# Patient Record
Sex: FEMALE | Race: WHITE | NOT HISPANIC OR LATINO | Employment: FULL TIME | URBAN - METROPOLITAN AREA
[De-identification: names, ages, dates, MRNs, and addresses within clinical notes are randomized per-mention and may not be internally consistent; named-entity substitution may affect disease eponyms.]

---

## 2017-09-03 ENCOUNTER — COMMUNICATION - HEALTHEAST (OUTPATIENT)
Dept: SCHEDULING | Facility: CLINIC | Age: 37
End: 2017-09-03

## 2020-02-24 ENCOUNTER — COMMUNICATION - HEALTHEAST (OUTPATIENT)
Dept: SCHEDULING | Facility: CLINIC | Age: 40
End: 2020-02-24

## 2020-09-04 ENCOUNTER — TRANSFERRED RECORDS (OUTPATIENT)
Dept: HEALTH INFORMATION MANAGEMENT | Facility: CLINIC | Age: 40
End: 2020-09-04

## 2020-10-12 ENCOUNTER — COMMUNICATION - HEALTHEAST (OUTPATIENT)
Dept: FAMILY MEDICINE | Facility: CLINIC | Age: 40
End: 2020-10-12

## 2020-10-13 ENCOUNTER — COMMUNICATION - HEALTHEAST (OUTPATIENT)
Dept: FAMILY MEDICINE | Facility: CLINIC | Age: 40
End: 2020-10-13

## 2020-12-16 ENCOUNTER — COMMUNICATION - HEALTHEAST (OUTPATIENT)
Dept: FAMILY MEDICINE | Facility: CLINIC | Age: 40
End: 2020-12-16

## 2020-12-17 ENCOUNTER — OFFICE VISIT - HEALTHEAST (OUTPATIENT)
Dept: FAMILY MEDICINE | Facility: CLINIC | Age: 40
End: 2020-12-17

## 2020-12-17 DIAGNOSIS — R25.1 TREMOR: ICD-10-CM

## 2020-12-17 DIAGNOSIS — F07.81 POST CONCUSSIVE SYNDROME: ICD-10-CM

## 2020-12-17 DIAGNOSIS — K76.9 LESION OF LIVER: ICD-10-CM

## 2020-12-17 DIAGNOSIS — F41.1 GAD (GENERALIZED ANXIETY DISORDER): ICD-10-CM

## 2020-12-17 DIAGNOSIS — Z13.220 LIPID SCREENING: ICD-10-CM

## 2020-12-17 DIAGNOSIS — B97.7 HIGH RISK HPV INFECTION: ICD-10-CM

## 2020-12-17 LAB
ALBUMIN SERPL-MCNC: 4.4 G/DL (ref 3.5–5)
ALP SERPL-CCNC: 58 U/L (ref 45–120)
ALT SERPL W P-5'-P-CCNC: 11 U/L (ref 0–45)
ANION GAP SERPL CALCULATED.3IONS-SCNC: 11 MMOL/L (ref 5–18)
AST SERPL W P-5'-P-CCNC: 14 U/L (ref 0–40)
BILIRUB SERPL-MCNC: 0.5 MG/DL (ref 0–1)
BUN SERPL-MCNC: 8 MG/DL (ref 8–22)
CALCIUM SERPL-MCNC: 9 MG/DL (ref 8.5–10.5)
CHLORIDE BLD-SCNC: 104 MMOL/L (ref 98–107)
CHOLEST SERPL-MCNC: 209 MG/DL
CO2 SERPL-SCNC: 25 MMOL/L (ref 22–31)
CREAT SERPL-MCNC: 1.03 MG/DL (ref 0.6–1.1)
ERYTHROCYTE [DISTWIDTH] IN BLOOD BY AUTOMATED COUNT: 11.5 % (ref 11–14.5)
FASTING STATUS PATIENT QL REPORTED: NO
GFR SERPL CREATININE-BSD FRML MDRD: 59 ML/MIN/1.73M2
GLUCOSE BLD-MCNC: 88 MG/DL (ref 70–125)
HCT VFR BLD AUTO: 39.3 % (ref 35–47)
HDLC SERPL-MCNC: 77 MG/DL
HGB BLD-MCNC: 13.2 G/DL (ref 12–16)
LDLC SERPL CALC-MCNC: 114 MG/DL
MCH RBC QN AUTO: 30.4 PG (ref 27–34)
MCHC RBC AUTO-ENTMCNC: 33.5 G/DL (ref 32–36)
MCV RBC AUTO: 91 FL (ref 80–100)
PLATELET # BLD AUTO: 276 THOU/UL (ref 140–440)
PMV BLD AUTO: 7.3 FL (ref 7–10)
POTASSIUM BLD-SCNC: 4.4 MMOL/L (ref 3.5–5)
PROT SERPL-MCNC: 7 G/DL (ref 6–8)
RBC # BLD AUTO: 4.32 MILL/UL (ref 3.8–5.4)
SODIUM SERPL-SCNC: 140 MMOL/L (ref 136–145)
TRIGL SERPL-MCNC: 92 MG/DL
WBC: 5.8 THOU/UL (ref 4–11)

## 2020-12-17 RX ORDER — DIPHENHYDRAMINE HCL 25 MG
25 CAPSULE ORAL
Status: SHIPPED | COMMUNITY
Start: 2020-10-23 | End: 2021-07-14

## 2020-12-17 RX ORDER — NITROFURANTOIN 25; 75 MG/1; MG/1
CAPSULE ORAL
Status: SHIPPED | COMMUNITY
Start: 2020-12-16 | End: 2021-07-14

## 2020-12-17 RX ORDER — HYDROXYZINE PAMOATE 25 MG/1
25 CAPSULE ORAL 2 TIMES DAILY PRN
Status: SHIPPED | COMMUNITY
Start: 2020-12-17

## 2020-12-17 RX ORDER — RIBOFLAVIN (VITAMIN B2) 100 MG
400 TABLET ORAL
Status: SHIPPED | COMMUNITY
Start: 2020-10-23 | End: 2021-07-14

## 2020-12-17 RX ORDER — CYCLOBENZAPRINE HCL 10 MG
TABLET ORAL
Status: SHIPPED | COMMUNITY
Start: 2020-09-06 | End: 2021-07-14

## 2020-12-17 RX ORDER — AMITRIPTYLINE HYDROCHLORIDE 50 MG/1
50 TABLET ORAL
Status: SHIPPED | COMMUNITY
Start: 2020-10-23 | End: 2021-08-11

## 2020-12-17 RX ORDER — BUPROPION HYDROCHLORIDE 150 MG/1
150 TABLET, EXTENDED RELEASE ORAL
Status: SHIPPED | COMMUNITY
Start: 2020-12-17 | End: 2021-12-20

## 2020-12-17 ASSESSMENT — MIFFLIN-ST. JEOR: SCORE: 1510.68

## 2020-12-18 LAB
HPV SOURCE: NORMAL
HUMAN PAPILLOMA VIRUS 16 DNA: NEGATIVE
HUMAN PAPILLOMA VIRUS 18 DNA: NEGATIVE
HUMAN PAPILLOMA VIRUS FINAL DIAGNOSIS: NORMAL
HUMAN PAPILLOMA VIRUS OTHER HR: NEGATIVE
SPECIMEN DESCRIPTION: NORMAL

## 2020-12-19 ENCOUNTER — COMMUNICATION - HEALTHEAST (OUTPATIENT)
Dept: FAMILY MEDICINE | Facility: CLINIC | Age: 40
End: 2020-12-19

## 2020-12-29 ENCOUNTER — COMMUNICATION - HEALTHEAST (OUTPATIENT)
Dept: OBGYN | Facility: CLINIC | Age: 40
End: 2020-12-29

## 2020-12-29 ENCOUNTER — COMMUNICATION - HEALTHEAST (OUTPATIENT)
Dept: FAMILY MEDICINE | Facility: CLINIC | Age: 40
End: 2020-12-29

## 2021-01-22 ENCOUNTER — RECORDS - HEALTHEAST (OUTPATIENT)
Dept: ADMINISTRATIVE | Facility: OTHER | Age: 41
End: 2021-01-22

## 2021-01-25 ENCOUNTER — RECORDS - HEALTHEAST (OUTPATIENT)
Dept: ADMINISTRATIVE | Facility: OTHER | Age: 41
End: 2021-01-25

## 2021-01-25 ENCOUNTER — OFFICE VISIT - HEALTHEAST (OUTPATIENT)
Dept: FAMILY MEDICINE | Facility: CLINIC | Age: 41
End: 2021-01-25

## 2021-01-25 DIAGNOSIS — R50.9 FEVER, UNSPECIFIED FEVER CAUSE: ICD-10-CM

## 2021-01-25 DIAGNOSIS — J06.9 VIRAL URI WITH COUGH: ICD-10-CM

## 2021-01-25 DIAGNOSIS — R05.9 COUGH: ICD-10-CM

## 2021-01-25 LAB
FLUAV AG SPEC QL IA: NORMAL
FLUBV AG SPEC QL IA: NORMAL

## 2021-01-27 ENCOUNTER — COMMUNICATION - HEALTHEAST (OUTPATIENT)
Dept: SCHEDULING | Facility: CLINIC | Age: 41
End: 2021-01-27

## 2021-01-28 ENCOUNTER — COMMUNICATION - HEALTHEAST (OUTPATIENT)
Dept: FAMILY MEDICINE | Facility: CLINIC | Age: 41
End: 2021-01-28

## 2021-01-28 ENCOUNTER — OFFICE VISIT - HEALTHEAST (OUTPATIENT)
Dept: FAMILY MEDICINE | Facility: CLINIC | Age: 41
End: 2021-01-28

## 2021-01-28 DIAGNOSIS — G44.209 TENSION HEADACHE: ICD-10-CM

## 2021-01-28 DIAGNOSIS — R11.2 NON-INTRACTABLE VOMITING WITH NAUSEA, UNSPECIFIED VOMITING TYPE: ICD-10-CM

## 2021-01-28 DIAGNOSIS — E87.1 HYPONATREMIA: ICD-10-CM

## 2021-01-28 DIAGNOSIS — Z20.822 SUSPECTED COVID-19 VIRUS INFECTION: ICD-10-CM

## 2021-01-28 RX ORDER — ONDANSETRON 4 MG/1
4 TABLET, ORALLY DISINTEGRATING ORAL EVERY 8 HOURS PRN
Qty: 30 TABLET | Refills: 0 | Status: SHIPPED | OUTPATIENT
Start: 2021-01-28

## 2021-01-28 RX ORDER — NAPROXEN 500 MG/1
500 TABLET ORAL 2 TIMES DAILY PRN
Qty: 60 TABLET | Refills: 1 | Status: SHIPPED | OUTPATIENT
Start: 2021-01-28 | End: 2021-12-20

## 2021-03-22 ENCOUNTER — TRANSFERRED RECORDS (OUTPATIENT)
Dept: HEALTH INFORMATION MANAGEMENT | Facility: CLINIC | Age: 41
End: 2021-03-22

## 2021-03-25 ENCOUNTER — OFFICE VISIT - HEALTHEAST (OUTPATIENT)
Dept: FAMILY MEDICINE | Facility: CLINIC | Age: 41
End: 2021-03-25

## 2021-03-25 ENCOUNTER — COMMUNICATION - HEALTHEAST (OUTPATIENT)
Dept: FAMILY MEDICINE | Facility: CLINIC | Age: 41
End: 2021-03-25

## 2021-03-25 DIAGNOSIS — F41.1 GAD (GENERALIZED ANXIETY DISORDER): ICD-10-CM

## 2021-03-25 DIAGNOSIS — M47.22 OSTEOARTHRITIS OF SPINE WITH RADICULOPATHY, CERVICAL REGION: ICD-10-CM

## 2021-03-25 DIAGNOSIS — M47.816 LUMBAR SPONDYLOSIS: ICD-10-CM

## 2021-03-25 DIAGNOSIS — S06.9X9D MILD TRAUMATIC BRAIN INJURY WITH LOSS OF CONSCIOUSNESS, SUBSEQUENT ENCOUNTER: ICD-10-CM

## 2021-03-25 DIAGNOSIS — R25.1 TREMOR: ICD-10-CM

## 2021-03-25 DIAGNOSIS — K76.9 LESION OF LIVER: ICD-10-CM

## 2021-03-25 RX ORDER — TIZANIDINE 2 MG/1
2 TABLET ORAL
Status: SHIPPED | COMMUNITY
Start: 2021-03-24 | End: 2021-07-14

## 2021-03-25 RX ORDER — BACLOFEN 10 MG/1
TABLET ORAL
Status: SHIPPED | COMMUNITY
Start: 2021-03-12 | End: 2021-07-14

## 2021-03-25 RX ORDER — CLONAZEPAM 1 MG/1
1 TABLET ORAL 2 TIMES DAILY PRN
Status: SHIPPED | COMMUNITY
Start: 2021-03-19

## 2021-04-27 ENCOUNTER — RECORDS - HEALTHEAST (OUTPATIENT)
Dept: ADMINISTRATIVE | Facility: OTHER | Age: 41
End: 2021-04-27

## 2021-05-26 VITALS
SYSTOLIC BLOOD PRESSURE: 119 MMHG | WEIGHT: 180.4 LBS | HEART RATE: 84 BPM | HEIGHT: 67 IN | DIASTOLIC BLOOD PRESSURE: 83 MMHG | BODY MASS INDEX: 28.31 KG/M2

## 2021-06-02 ENCOUNTER — RECORDS - HEALTHEAST (OUTPATIENT)
Dept: ADMINISTRATIVE | Facility: OTHER | Age: 41
End: 2021-06-02

## 2021-06-05 VITALS — HEIGHT: 68 IN | BODY MASS INDEX: 27.43 KG/M2

## 2021-06-05 VITALS — BODY MASS INDEX: 26.61 KG/M2 | WEIGHT: 175 LBS

## 2021-06-06 NOTE — TELEPHONE ENCOUNTER
Patient states that she has struggled with an abnormal heart beat for years. However tonight it is different with tightness in chest, a little bit of heartburn, a little shortness of breath, and yawning constantly    No previous cardiac work-up    Beating really hard and fast then slows for a little bit and then goes back to beating hard and fast  -lasts about a minute    Has had the irregularity often over the years but not the tightness and heartburn    Rates tightness as a 2-3/10, pressure like  Heart burn 4/10    Does not get heart burn often, only when pregnant. Last pregnancy 11 years ago  -does not think she is pregnant,  had vasectomy     Started 3 hours ago, nothing makes it better or worse. Is staying the same    Has had some anxiety this evening, son has behavioral problems and they had an outburst tonight    Strong family history for heart disease including AFib and heart attacks.     Quit smoking 3 months ago    No hx HTN  No hx diabetes  No hx obesity  No hx hypercholesteremia   No birthcontrol   No hx blood clots  No hx Emphysema     No dizziness  No fever  No sweating  No nausea or vomiting  No cough    Triaged to a disposition of Call 911 EMS Now. Patient is agreeable to either call EMS or to Go Directly to ED.     Reason for Disposition    [1] Chest pain lasts > 5 minutes AND [2] described as crushing, pressure-like, or heavy    [1] Chest pain lasts > 5 minutes AND [2] age > 30 AND [3] at least one cardiac risk factor (i.e., hypertension, diabetes, obesity, smoker or strong family history of heart disease)    Protocols used: CHEST PAIN-A-    Michelle Carrasco RN Triage Nurse Advisor

## 2021-06-12 NOTE — TELEPHONE ENCOUNTER
Left message to call back for: Alex  Information to relay to patient:  LMTCB. Pt has a meet and greet scheduled with Dr. Spencer this afternoon. Does pt have a concern she would like to discuss with Dr. Spencer. Please let her know she will be charged an office visit for this appointment.
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
## 2021-06-13 NOTE — TELEPHONE ENCOUNTER
Left message to call back for: Alex  Information to relay to patient:  STANLEY. Pt has a meet and greet scheduled with Dr. Spencer on 12/17. Does pt have a concern she would like to discuss with Dr. Spencer. Please let her know she will be charged an office visit for this appointment.

## 2021-06-14 NOTE — PATIENT INSTRUCTIONS - HE
"Xray was normal.  I will call if your flu is positive in about 20-30 minutes.  Covid test will be back tomorrow - sign up for and use [employer] to get results ASAP.  Isolate in your own room away from others.        Symptomatic treatment - rest, push fluids, tylenol or ibuprofen.  Come back if increasing shortness of breath (eg. Can't walk around house without taking a break due to shortness of breath)    Discharge Instructions for COVID-19 Patients  You have--or may have--COVID-19. Please follow the instructions listed below.   If you have a weakened immune system, discuss with your doctor any other actions you need to take.  How can I protect others?  If you have symptoms (fever, cough, body aches or trouble breathing):    Stay home and away from others (self-isolate) until:  ? Your other symptoms have resolved (gotten better). And   ? You've had no fever--and no medicine that reduces fever--for 1 full day (24 hours). And   ? At least 10 days have passed since your symptoms started. (You may need to wait 20 days. Follow the advice of your care team.)  If you don't show symptoms, but testing showed that you have COVID-19:    Stay home and away from others (self-isolate) until at least 10 days have passed since the date of your first positive COVID-19 test.  During this time    Stay in your own room, even for meals. Use your own bathroom if you can.    Stay away from others in your home. No hugging, kissing or shaking hands. No visitors.    Don't go to work, school or anywhere else.    Clean \"high touch\" surfaces often (doorknobs, counters, handles). Use household cleaning spray or wipes.    You'll find a full list of  on the EPA website: www.epa.gov/pesticide-registration/list-n-disinfectants-use-against-sars-cov-2.    Cover your mouth and nose with a mask or other face covering to avoid spreading germs.    Wash your hands and face often. Use soap and water.    Caregivers in these groups are at risk for " severe illness due to COVID-19:  ? People 65 years and older  ? People who live in a nursing home or long-term care facility  ? People with chronic disease (lung, heart, cancer, diabetes, kidney, liver, immunologic)  ? People who have a weakened immune system, including those who:    Are in cancer treatment    Take medicine that weakens the immune system, such as corticosteroids    Had a bone marrow or organ transplant    Have an immune deficiency    Have poorly controlled HIV or AIDS    Are obese (body mass index of 40 or higher)    Smoke regularly    Caregivers should wear gloves while washing dishes, handling laundry and cleaning bedrooms and bathrooms.    Use caution when washing and drying laundry: Don't shake dirty laundry and use the warmest water setting that you can.    For more tips on managing your health at home, go to www.cdc.gov/coronavirus/2019-ncov/downloads/10Things.pdf.  How can I take care of myself at home?  1. Get lots of rest. Drink extra fluids (unless a doctor has told you not to).  2. Take Tylenol (acetaminophen) for fever or pain. If you have liver or kidney problems, ask your family doctor if it's okay to take Tylenol.   Adults can take either:   ? 650 mg (two 325 mg pills) every 4 to 6 hours, or   ? 1,000 mg (two 500 mg pills) every 8 hours as needed.  ? Note: Don't take more than 3,000 mg in one day. Acetaminophen is found in many medicines (both prescribed and over-the-counter medicines). Read all labels to be sure you don't take too much.   For children, check the Tylenol bottle for the right dose. The dose is based on the child's age or weight.  3. If you have other health problems (like cancer, heart failure, an organ transplant or severe kidney disease): Call your specialty clinic if you don't feel better in the next 2 days.  4. Know when to call 911. Emergency warning signs include:  ? Trouble breathing or shortness of breath  ? Pain or pressure in the chest that doesn't go  away  ? Feeling confused like you haven't felt before, or not being able to wake up  ? Bluish-colored lips or face  5. Your doctor may have prescribed a blood thinner medicine. Follow their instructions.  Where can I get more information?    [facility] - About COVID-19: [email or web address]    CDC - What to Do If You're Sick: www.cdc.gov/coronavirus/2019-ncov/about/steps-when-sick.html    CDC - Ending Home Isolation: www.cdc.gov/coronavirus/2019-ncov/hcp/disposition-in-home-patients.html    [facility] [place] - Caring for Someone: www.cdc.gov/coronavirus/2019-ncov/if-you-are-sick/care-for-someone.html    [facility] - Interim Guidance for Hospital Discharge to Home: www.[facility].[place].us/diseases/coronavirus/hcp/hospdischarge.pdf    Below are the COVID-19 hotlines at the [place] Department of Health ([facility]). Interpreters are available.  ? For health questions: Call 481-898-7176 or 1-716.689.8554 (7 a.m. to 7 p.m.)  ? For questions about schools and childcare: Call 486-549-5764 or 1-104.883.1167 (7 a.m. to 7 p.m.)    For informational purposes only. Not to replace the advice of your health care provider. Clinically reviewed by Dr. Terrence Levin.   Copyright   2020 [facility]. All rights reserved. [employer]â„¢ 263712 - REV 01/05/21.

## 2021-06-14 NOTE — TELEPHONE ENCOUNTER
Patient can be seen at walk-in clinic or can try a virtual visit.  It sounds like she should probably be seen.

## 2021-06-14 NOTE — TELEPHONE ENCOUNTER
Patient's Contreras is calling because patient was tested for Covid and flu on Monday and it came back negative. Patient is not feeling any better and they are wondering if she should be tested again or what steps they should take next to help her.  Contreras can be reached at [phone] for more information or advice.  You can call patient too but she will probably not answer if she is sleeping.
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
## 2021-06-14 NOTE — PROGRESS NOTES
Chief Complaint   Patient presents with     Cough     x3d, SOB, bodyaches, HA, dizziness, nausea, fever       ASSESSMENT & PLAN:   Diagnoses and all orders for this visit:    Viral URI with cough    Cough  -     Symptomatic COVID-19 Virus (CORONAVIRUS) PCR  -     XR Chest 2 Views  -     Influenza A/B Rapid Test- Nasal Swab    Fever, unspecified fever cause  -     Symptomatic COVID-19 Virus (CORONAVIRUS) PCR  -     XR Chest 2 Views  -     Influenza A/B Rapid Test- Nasal Swab      Patient with Covid symptoms associated with mild shortness of breath.  X-ray is negative.  Flu is negative.  Isolation discussed.  Recheck in 4 days if still febrile, sooner if shortness of breath is worsening.  Vital signs are normal and she is stable for discharge at this time.      Push fluids.  Symptomatic care.    Supportive care discussed.  See discharge instructions below for specific recommendations given.    At the end of the encounter, I discussed results, diagnosis, medications with the patient and/or caregivers. Discussed red flags for immediate return to clinic/ER, as well as indications for follow up if no improvement. Patient and/or caregiver understood and agreed to plan. Patient was stable for discharge.          SUBJECTIVE    HPI:  HPI  Alex MCCARTHY presents to the walk-in clinic with   Chief Complaint   Patient presents with     Cough     x3d, SOB, bodyaches, HA, dizziness, nausea, fever     Agree with MA note.  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    No one else is ill at this time.    Feeling a little shortness of breath, severe body aches, fever, poor appetite, loss of smell.    See ROS for additional symptoms and/or pertinent negatives.       History obtained from spouse and the patient.      Review of Systems    OBJECTIVE    Vitals:    01/25/21 1736   BP: 104/73   Patient Site: Left Arm   Patient Position: Lying   Cuff Size: Adult Regular   Pulse: (!) 109   Resp: 20   Temp: (!)  101.8  F (38.8  C)   TempSrc: Oral   SpO2: 95%       Physical Exam  Constitutional:       General: She is not in acute distress.     Appearance: She is well-developed.   HENT:      Right Ear: Tympanic membrane and external ear normal.      Left Ear: Tympanic membrane and external ear normal.      Nose: No congestion or rhinorrhea.      Mouth/Throat:      Pharynx: Posterior oropharyngeal erythema (Mild) present.   Eyes:      General:         Right eye: No discharge.         Left eye: No discharge.      Conjunctiva/sclera: Conjunctivae normal.   Cardiovascular:      Rate and Rhythm: Tachycardia present.   Pulmonary:      Effort: Pulmonary effort is normal.      Breath sounds: Normal breath sounds.      Comments: Intermittent wet cough noted  Musculoskeletal: Normal range of motion.   Lymphadenopathy:      Cervical: No cervical adenopathy.   Skin:     General: Skin is warm and dry.      Capillary Refill: Capillary refill takes less than 2 seconds.      Coloration: Skin is pale.   Neurological:      Mental Status: She is alert and oriented to person, place, and time.   Psychiatric:         Mood and Affect: Mood normal.         Behavior: Behavior normal.         Thought Content: Thought content normal.         Judgment: Judgment normal.         Labs/EKG:  Results for orders placed or performed in visit on 01/25/21   Influenza A/B Rapid Test- Nasal Swab    Specimen: Nasal Swab; Nasopharyngeal (Inpt/ED) or Nasal Mucosa (Outpt)   Result Value Ref Range    Influenza  A, Rapid Antigen No Influenza A antigen detected No Influenza A antigen detected    Influenza B, Rapid Antigen No Influenza B antigen detected No Influenza B antigen detected           Radiology:    Xr Chest 2 Views    Result Date: 1/25/2021  EXAM DATE:         01/25/2021 EXAM: X-RAY CHEST, 2 VIEWS, FRONTAL AND LATERAL LOCATION: [facility] DATE/TIME: 1/25/2021 6:15 PM INDICATION: cough, fever, SOB COMPARISON: None. IMPRESSION: Negative chest.  "      Radiology result(s) from this visit was independently reviewed by Caity Aceves CNP -no acute pathology identified inclusive of consolidation or Covid pneumonia      PATIENT INSTRUCTIONS:   Patient Instructions   Xray was normal.  I will call if your flu is positive in about 20-30 minutes.  Covid test will be back tomorrow - sign up for and use [employer] to get results ASAP.  Isolate in your own room away from others.        Symptomatic treatment - rest, push fluids, tylenol or ibuprofen.  Come back if increasing shortness of breath (eg. Can't walk around house without taking a break due to shortness of breath)    Discharge Instructions for COVID-19 Patients  You have--or may have--COVID-19. Please follow the instructions listed below.   If you have a weakened immune system, discuss with your doctor any other actions you need to take.  How can I protect others?  If you have symptoms (fever, cough, body aches or trouble breathing):    Stay home and away from others (self-isolate) until:  ? Your other symptoms have resolved (gotten better). And   ? You've had no fever--and no medicine that reduces fever--for 1 full day (24 hours). And   ? At least 10 days have passed since your symptoms started. (You may need to wait 20 days. Follow the advice of your care team.)  If you don't show symptoms, but testing showed that you have COVID-19:    Stay home and away from others (self-isolate) until at least 10 days have passed since the date of your first positive COVID-19 test.  During this time    Stay in your own room, even for meals. Use your own bathroom if you can.    Stay away from others in your home. No hugging, kissing or shaking hands. No visitors.    Don't go to work, school or anywhere else.    Clean \"high touch\" surfaces often (doorknobs, counters, handles). Use household cleaning spray or wipes.    You'll find a full list of  on the EPA website: " www.epa.gov/pesticide-registration/list-n-disinfectants-use-against-sars-cov-2.    Cover your mouth and nose with a mask or other face covering to avoid spreading germs.    Wash your hands and face often. Use soap and water.    Caregivers in these groups are at risk for severe illness due to COVID-19:  ? People 65 years and older  ? People who live in a nursing home or long-term care facility  ? People with chronic disease (lung, heart, cancer, diabetes, kidney, liver, immunologic)  ? People who have a weakened immune system, including those who:    Are in cancer treatment    Take medicine that weakens the immune system, such as corticosteroids    Had a bone marrow or organ transplant    Have an immune deficiency    Have poorly controlled HIV or AIDS    Are obese (body mass index of 40 or higher)    Smoke regularly    Caregivers should wear gloves while washing dishes, handling laundry and cleaning bedrooms and bathrooms.    Use caution when washing and drying laundry: Don't shake dirty laundry and use the warmest water setting that you can.    For more tips on managing your health at home, go to www.cdc.gov/coronavirus/2019-ncov/downloads/10Things.pdf.  How can I take care of myself at home?  1. Get lots of rest. Drink extra fluids (unless a doctor has told you not to).  2. Take Tylenol (acetaminophen) for fever or pain. If you have liver or kidney problems, ask your family doctor if it's okay to take Tylenol.   Adults can take either:   ? 650 mg (two 325 mg pills) every 4 to 6 hours, or   ? 1,000 mg (two 500 mg pills) every 8 hours as needed.  ? Note: Don't take more than 3,000 mg in one day. Acetaminophen is found in many medicines (both prescribed and over-the-counter medicines). Read all labels to be sure you don't take too much.   For children, check the Tylenol bottle for the right dose. The dose is based on the child's age or weight.  3. If you have other health problems (like cancer, heart failure, an organ  transplant or severe kidney disease): Call your specialty clinic if you don't feel better in the next 2 days.  4. Know when to call 911. Emergency warning signs include:  ? Trouble breathing or shortness of breath  ? Pain or pressure in the chest that doesn't go away  ? Feeling confused like you haven't felt before, or not being able to wake up  ? Bluish-colored lips or face  5. Your doctor may have prescribed a blood thinner medicine. Follow their instructions.  Where can I get more information?    [facility] - About COVID-19: [email or web address]    CDC - What to Do If You're Sick: www.cdc.gov/coronavirus/2019-ncov/about/steps-when-sick.html    CDC - Ending Home Isolation: www.cdc.gov/coronavirus/2019-ncov/hcp/disposition-in-home-patients.html    CDC - Caring for Someone: www.cdc.gov/coronavirus/2019-ncov/if-you-are-sick/care-for-someone.html    [facility] - Interim Guidance for Hospital Discharge to Home: www.health.[facility].mn./diseases/coronavirus/hcp/hospdischarge.pdf    Below are the COVID-19 hotlines at the [place] Department of Health ([facility]). Interpreters are available.  ? For health questions: Call 014-438-6050 or 1-259.290.1491 (7 a.m. to 7 p.m.)  ? For questions about schools and childcare: Call 628-257-0024 or 1-811.274.9817 (7 a.m. to 7 p.m.)    For informational purposes only. Not to replace the advice of your health care provider. Clinically reviewed by Dr. Terrence Levin.   Copyright   2020 [employer]. All rights reserved. [employer] [id number] - REV 01/05/21.

## 2021-06-14 NOTE — PROGRESS NOTES
12/4/14 NIL pap, neg HPV  9/28/18 NIL pap, +HR HPV (not 16/18)  12/17/20 NIL pap, neg HPV. Plan: cotest in 1 year

## 2021-06-16 PROBLEM — R25.1 TREMOR: Status: ACTIVE | Noted: 2020-12-20

## 2021-06-16 PROBLEM — M47.816 LUMBAR SPONDYLOSIS: Status: ACTIVE | Noted: 2021-03-10
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
## 2021-06-16 PROBLEM — M47.812 OSTEOARTHRITIS OF CERVICAL SPINE: Status: ACTIVE | Noted: 2021-03-10

## 2021-06-16 PROBLEM — S06.9XAA MILD TRAUMATIC BRAIN INJURY (H): Status: ACTIVE | Noted: 2021-03-10

## 2021-06-16 PROBLEM — Z86.79 HISTORY OF IRREGULAR HEARTBEAT: Status: ACTIVE | Noted: 2020-09-16

## 2021-06-16 PROBLEM — F43.23 ADJUSTMENT DISORDER WITH MIXED ANXIETY AND DEPRESSED MOOD: Status: ACTIVE | Noted: 2021-03-10

## 2021-06-16 PROBLEM — G89.29 CHRONIC NONINTRACTABLE HEADACHE: Status: ACTIVE | Noted: 2021-03-10

## 2021-06-16 PROBLEM — R51.9 CHRONIC NONINTRACTABLE HEADACHE: Status: ACTIVE | Noted: 2021-03-10

## 2021-06-16 PROBLEM — K76.9 LESION OF LIVER: Status: ACTIVE | Noted: 2020-12-20

## 2021-06-16 NOTE — TELEPHONE ENCOUNTER
Incoming call from Stephanie from [facility] stating patient cancelled her MRI for the hepatic hemangioma because insurance would not cover scan.  FYI.
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
## 2021-06-18 NOTE — PATIENT INSTRUCTIONS - HE
Patient Instructions by Laura Resendez DO at 1/28/2021  1:40 PM     Author: Laura Resendez DO Service: -- Author Type: Physician    Filed: 1/28/2021  1:53 PM Encounter Date: 1/28/2021 Status: Signed    : Laura Resendez DO (Physician)       Patient Education     Coronavirus Disease 2019 (COVID-19): Caring for Yourself or Others   If you or a household member have symptoms of COVID-19, follow the guidelines below. This will help you manage symptoms and keep the virus from spreading.  If you have symptoms of COVID-19    Stay home and contact your care team. They will tell you what to do. You may be told to stay home and away from others (self-isolate). You may also be told to stay at least 6 feet from others (social distancing).    Stay away from work, school, and public places.    Limit physical contact with others in your home. Limit visitors. No kissing.  Clean surfaces you touch with disinfectant.  If you need to cough or sneeze, do it into a tissue. Then throw the tissue into the trash. If you don't have tissues, cough or sneeze into the bend of your elbow.  Dont share food or personal items with people in your household. This includes items like eating and drinking utensils, towels, and bedding.  Wear a cloth face mask around other people. During a public health emergency, medical face masks may be reserved for healthcare workers. You may need to make a cloth face mask of your own. You can do this using a bandana, T-shirt, or other cloth. The CDC has instructions on how to make a face mask. Wear the mask so that it covers both your nose and mouth.  If you need to go to a hospital or clinic, call ahead to let them know. Expect the care team to wear masks, gowns, gloves, and eye protection. You may need to come to a different entrance or wait in a separate room.  Follow all instructions from your care team.    If youve been diagnosed with COVID-19    Stay home and away from others, including other  people in your home. (This is called self-isolation.) Dont leave home unless you need to get medical care. Dont go to work, school, or public places. Dont use buses, taxis, or other public transportation.    Follow all instructions from your care team.    If you need to go to a hospital or clinic, call ahead to let them know. Expect the care team to wear masks, gowns, gloves, and eye protection. You may need to come to a different entrance or wait in a separate room.    Wear a face mask over your nose and mouth. This is to protect others from your germs. If you cant wear a mask, your caregivers should wear one. You may need to make your own mask using a bandana, T-shirt, or other cloth. See the [facility] instructions on how to make a face mask.    Have no contact with pets and other animals.    Dont share food or personal items with people in your household. This includes items like eating and drinking utensils, towels, and bedding.    If you need to cough or sneeze, do it into a tissue. Then throw the tissue into the trash. If you don't have tissues, cough or sneeze into the bend of your elbow.    Wash your hands often.    Self-care at home  The FDA has approved an antiviral medicine (called remdesivir) for people being treated in the hospital. This is for people 12 years and older who weigh more than about 88 pounds (40 kgs). In certain cases, it may also be used for people younger than 12 years or who weigh less than about 88 pounds (40 kgs)..  Currently, treatment is mostly aimed at helping your body while it fights the virus.    Getting extra rest.    Drink extra fluids 6 to 8 glasses of liquids each day), unless a doctor has told you not to. Ask your care team which fluids are best for you. Avoid fluids that contain caffeine or alcohol.    Taking over-the-counter (OTC) medicine to reduce pain and fever. Your care team will tell you which medicine to use.  If youve been in the hospital for COVID-19, follow your care  teams instructions. They will tell you when to stop self-isolation. They may also have you change positions to help your breathing (such as lying on your belly).  If a test showed that you have COVID-19, you may be asked to donate plasma after youve recovered. (This is called COVID-19 convalescent plasma donation.) The plasma may contain antibodies to help fight the virus in others. Experts don't know the safety of plasma or how well it works. Research continues, and the FDA has approved it for emergency use in certain people with serious or life-threatening COVID-19.  Caring for a sick person     Follow all instructions from the care team.    Wash your hands often.    Wear protective clothing as advised.    Make sure the sick person wears a mask. If they can't wear a mask, dont stay in the same room with them. If you must be in the same room, wear a face mask. Make sure the mask covers both the nose and mouth.    Keep track of the sick persons symptoms.    Clean surfaces often with disinfectant. This includes phones, kitchen counters, fridge door handles, bathroom surfaces, and others.    Dont let anyone share household items with the sick person. This includes eating and drinking tools, towels, sheets, or blankets.    Clean fabrics and laundry well.    Keep other people and pets away from the sick person.    When you can stop self-isolation  When you are sick with COVID-19, you should stay away from other people. This is called self-isolation. The rules for ending self-isolation depend on your health in general.  If you are normally healthy:  You can stop self-isolation when all 3 of these are true:    Youve had no fever--and no medicine that reduces fever--for at least 24 hours. And?    Your symptoms are better, such as cough or trouble breathing. And?    At least 10 days have passed since your symptoms first started.  Talk with your care team before you leave home. They may tell you its okay to leave, or they  may give you different advice. You do not need to be re-tested.  If you have a weak immune system, or youve had severe COVID-19:  Follow your care teams instructions. You may be asked to self-isolate for 10 days to 20 days after your symptoms first started. Your care team may want to re-test you for COVID-19.  Note: If youre being treated for cancer, have an immune disorder (such as HIV), or have had a transplant (organ or bone marrow), you may have a weak immune system.  If you've already had COVID-19 once:  If you had the virus over 3 months ago, and youve been exposed again, treat it like you've never had COVID-19. Stay home, limit your contact with others, call your care team, and watch for symptoms.  If its been less than 3 months since you had the virus, youre symptom-free, and youve been exposed again: You dont need to self-isolate. You dont need to be re-tested, unless you have new symptoms of COVID-19 that cant be linked to another illness. But if you develop new symptoms, stay home. Contact your care team if you have questions.  When you return to public settings  When you are well enough to go outside your home, follow the CDCs guidance on cloth face masks.    Anyone over age 2 should wear a face mask in public, especially when it's hard to socially distance. This includes public transit, public protests and marches, and crowded stores, bars, and restaurants.    Face masks are most likely to reduce the spread of COVID-19 when they are widely used by people who are out in the public.  Certain people should not wear a face covering. These include:    Children under 2 years old    Anyone with a health, developmental, or mental health condition that can be made worse by wearing a mask    Anyone who is unconscious or unable to remove the face covering without help. See the CDC's guidance on who should not wear a face mask.  When to call your care team  Call your care team right away if a sick person has any of  these:    Trouble breathing    Pain or pressure in chest  If a sick person has any of these, call 911:    Trouble breathing that gets worse    Pain or pressure in chest that gets worse    Blue tint to lips or face    Fast or irregular heartbeat    Confusion or trouble waking    Fainting or loss of consciousness    Coughing up blood  Going home from the hospital   If you have COVID-19 and were recently in the hospital:    Follow the instructions above for self-care and isolation.    Follow the hospital care teams instructions.    Ask questions if anything is unclear to you. Write down answers so you remember them.  Date last modified: 1/15/2021  [employer] last reviewed this educational content on 4/1/2020  This information has been modified by your health care provider with permission from the publisher.    8882-6442 [employer]. [address] [place] [address], [place] [address]. All rights reserved. This information is not intended as a substitute for professional medical care. Always follow your healthcare professional's instructions.

## 2021-06-20 NOTE — LETTER
Letter by Juliet Spencer MD at      Author: Juliet Spencer MD Service: -- Author Type: --    Filed:  Encounter Date: 10/13/2020 Status: (Other)         Alex Feldman  [address] [address]      2020      Dear Alex Feldman,   : 1980      This letter is in regards to the appointment that you had scheduled on 10- at the [facility] with Dr. Spencer.     The [facility] strives to see all patients in a timely manner and we need your help to achieve this.  The above-mentioned appointment was missed and we do not have record of a cancellation by you.  Whenever possible, we request appointment cancellations at least 72 hours in advance.  This time allows us to offer the appointment to another patient in need.      If you feel you have received this letter in error, or if you need to reschedule this appointment, please call our office so that we may update our records.      Sincerely,    [facility]

## 2021-06-21 NOTE — LETTER
Letter by Astrid Main RN at      Author: Astrid Main RN Service: -- Author Type: --    Filed:  Encounter Date: 12/29/2020 Status: (Other)         Viktoriajulia WELDONLEY  [address] [address]             December 29, 2020         Dear Ms. MCCARTHY,    We are happy to inform you that your recent Pap smear and Human Papillomavirus (HPV) test results are normal and negative.    It is recommended that you have your next Pap smear and Human Papillomavirus (HPV) test in 1 year. You will also need to return to the clinic every year for an annual wellness visit.    If you have additional questions regarding this result, please contact our office and we will be happy to assist you.      Sincerely,    Your [facility] Team

## 2021-06-21 NOTE — LETTER
Letter by Juliet Spencer MD at      Author: Juliet Spencer MD Service: -- Author Type: --    Filed:  Encounter Date: 12/19/2020 Status: (Other)         Alex MCCARTHY  [address] [facility] [address]             December 19, 2020         Dear Ms. MCCARTHY,    Below are the results from your recent visit:    Resulted Orders   Lipid Cascade RANDOM   Result Value Ref Range    Cholesterol 209 (H) <=199 mg/dL    Triglycerides 92 <=149 mg/dL    HDL Cholesterol 77 >=50 mg/dL    LDL Calculated 114 <=129 mg/dL    Patient Fasting > 8hrs? No    Comprehensive Metabolic Panel   Result Value Ref Range    Sodium 140 136 - 145 mmol/L    Potassium 4.4 3.5 - 5.0 mmol/L    Chloride 104 98 - 107 mmol/L    CO2 25 22 - 31 mmol/L    Anion Gap, Calculation 11 5 - 18 mmol/L    Glucose 88 70 - 125 mg/dL    BUN 8 8 - 22 mg/dL    Creatinine 1.03 0.60 - 1.10 mg/dL    GFR MDRD Af Amer >60 >60 mL/min/1.73m2    GFR MDRD Non Af Amer 59 (L) >60 mL/min/1.73m2    Bilirubin, Total 0.5 0.0 - 1.0 mg/dL    Calcium 9.0 8.5 - 10.5 mg/dL    Protein, Total 7.0 6.0 - 8.0 g/dL    Albumin 4.4 3.5 - 5.0 g/dL    Alkaline Phosphatase 58 45 - 120 U/L    AST 14 0 - 40 U/L    ALT 11 0 - 45 U/L    Narrative    Fasting Glucose reference range is 70-99 mg/dL per  American Diabetes Association (ADA) guidelines.   HM2(CBC w/o Differential)   Result Value Ref Range    WBC 5.8 4.0 - 11.0 thou/uL    RBC 4.32 3.80 - 5.40 mill/uL    Hemoglobin 13.2 12.0 - 16.0 g/dL    Hematocrit 39.3 35.0 - 47.0 %    MCV 91 80 - 100 fL    MCH 30.4 27.0 - 34.0 pg    MCHC 33.5 32.0 - 36.0 g/dL    RDW 11.5 11.0 - 14.5 %    Platelets 276 140 - 440 thou/uL    MPV 7.3 7.0 - 10.0 fL       Your lab results look good.  Kidney function is just a tiny bit reduced, continue to drink plenty of fluids.  Liver enzymes are normal, as are blood counts.  Cholesterol looks good.  I will place a referral to see a liver specialist about your liver lesion.    Please call with questions or  contact us using [employer].    Sincerely,        Electronically signed by Juliet Spencer MD

## 2021-06-26 ENCOUNTER — HEALTH MAINTENANCE LETTER (OUTPATIENT)
Age: 41
End: 2021-06-26

## 2021-07-07 ENCOUNTER — COMMUNICATION - HEALTHEAST (OUTPATIENT)
Dept: FAMILY MEDICINE | Facility: CLINIC | Age: 41
End: 2021-07-07

## 2021-07-07 NOTE — TELEPHONE ENCOUNTER
Telephone Encounter by Juliet Spencer MD at 7/7/2021 11:58 AM     Author: Juliet Spencer MD Service: -- Author Type: Physician    Filed: 7/7/2021 11:58 AM Encounter Date: 7/7/2021 Status: Signed    : Juliet Spencer MD (Physician)       We had discussed this a bit previously.  I can see her next week.
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
## 2021-07-14 ENCOUNTER — VIRTUAL VISIT (OUTPATIENT)
Dept: FAMILY MEDICINE | Facility: CLINIC | Age: 41
End: 2021-07-14
Payer: COMMERCIAL

## 2021-07-14 DIAGNOSIS — M47.816 LUMBAR SPONDYLOSIS: ICD-10-CM

## 2021-07-14 DIAGNOSIS — N80.9 ENDOMETRIOSIS: ICD-10-CM

## 2021-07-14 DIAGNOSIS — M25.532 LEFT WRIST PAIN: ICD-10-CM

## 2021-07-14 DIAGNOSIS — F41.1 GAD (GENERALIZED ANXIETY DISORDER): ICD-10-CM

## 2021-07-14 DIAGNOSIS — N95.1 MENOPAUSAL SYNDROME (HOT FLASHES): ICD-10-CM

## 2021-07-14 DIAGNOSIS — R25.1 TREMOR: ICD-10-CM

## 2021-07-14 DIAGNOSIS — H93.13 TINNITUS, BILATERAL: ICD-10-CM

## 2021-07-14 DIAGNOSIS — S06.9X9D MILD TRAUMATIC BRAIN INJURY WITH LOSS OF CONSCIOUSNESS, SUBSEQUENT ENCOUNTER: Primary | ICD-10-CM

## 2021-07-14 DIAGNOSIS — G89.21 CHRONIC PAIN DUE TO TRAUMA: ICD-10-CM

## 2021-07-14 PROBLEM — F45.1 SOMATIC SYMPTOM DISORDER, PERSISTENT, MODERATE, WITH PREDOMINANT PAIN: Status: ACTIVE | Noted: 2021-06-11

## 2021-07-14 PROBLEM — K80.00 CALCULUS OF GALLBLADDER WITH ACUTE CHOLECYSTITIS WITHOUT OBSTRUCTION: Status: ACTIVE | Noted: 2019-01-02

## 2021-07-14 PROBLEM — H53.143 PHOTOPHOBIA OF BOTH EYES: Status: ACTIVE | Noted: 2021-04-10

## 2021-07-14 PROCEDURE — 99215 OFFICE O/P EST HI 40 MIN: CPT | Mod: 95 | Performed by: FAMILY MEDICINE

## 2021-07-14 RX ORDER — SERTRALINE HYDROCHLORIDE 100 MG/1
200 TABLET, FILM COATED ORAL DAILY
COMMUNITY
Start: 2021-06-25 | End: 2022-07-20

## 2021-07-14 RX ORDER — OXYCODONE AND ACETAMINOPHEN 7.5; 325 MG/1; MG/1
1 TABLET ORAL 2 TIMES DAILY PRN
COMMUNITY
Start: 2021-07-12 | End: 2021-08-09

## 2021-07-14 RX ORDER — AMITRIPTYLINE HYDROCHLORIDE 75 MG/1
1 TABLET ORAL AT BEDTIME
COMMUNITY
Start: 2021-06-21 | End: 2021-12-15
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
## 2021-07-28 ENCOUNTER — OFFICE VISIT (OUTPATIENT)
Dept: PHYSICAL MEDICINE AND REHAB | Facility: CLINIC | Age: 41
End: 2021-07-28
Attending: FAMILY MEDICINE
Payer: COMMERCIAL

## 2021-07-28 ENCOUNTER — TELEPHONE (OUTPATIENT)
Dept: FAMILY MEDICINE | Facility: CLINIC | Age: 41
End: 2021-07-28

## 2021-07-28 VITALS
DIASTOLIC BLOOD PRESSURE: 78 MMHG | BODY MASS INDEX: 26.52 KG/M2 | HEIGHT: 68 IN | SYSTOLIC BLOOD PRESSURE: 126 MMHG | WEIGHT: 175 LBS

## 2021-07-28 DIAGNOSIS — M79.18 MYOFASCIAL PAIN: ICD-10-CM

## 2021-07-28 DIAGNOSIS — M47.816 LUMBAR SPONDYLOSIS: ICD-10-CM

## 2021-07-28 DIAGNOSIS — G89.21 CHRONIC PAIN DUE TO TRAUMA: Primary | ICD-10-CM

## 2021-07-28 DIAGNOSIS — M54.2 CERVICAL SPINE PAIN: ICD-10-CM

## 2021-07-28 DIAGNOSIS — M54.50 LUMBAR SPINE PAIN: ICD-10-CM

## 2021-07-28 PROCEDURE — 99205 OFFICE O/P NEW HI 60 MIN: CPT | Performed by: PHYSICAL MEDICINE & REHABILITATION

## 2021-07-28 RX ORDER — MELOXICAM 7.5 MG/1
7.5-15 TABLET ORAL DAILY
Qty: 60 TABLET | Refills: 1 | Status: SHIPPED | OUTPATIENT
Start: 2021-07-28 | End: 2021-12-20

## 2021-07-28 ASSESSMENT — PAIN SCALES - GENERAL: PAINLEVEL: WORST PAIN (10)

## 2021-07-28 ASSESSMENT — MIFFLIN-ST. JEOR: SCORE: 1497.29

## 2021-07-28 NOTE — TELEPHONE ENCOUNTER
Incoming call from pt wondering if we can help her get scheduled with psychiatry. She is transferring care from [facility] to us and they called on her referral to schedule her but they are booked out until December. With her TBI she cannot go that long without care. Can you see if you can get her in sooner somewhere?

## 2021-07-28 NOTE — TELEPHONE ENCOUNTER
There is a mental health referral from 7/14 which is the one I am assuming they called to schedule her off of? Can you use that one? Or do you need a psychiatry specific one?

## 2021-07-28 NOTE — PATIENT INSTRUCTIONS
1. Please get MRI images of Cervical and lumbar spine along with CT chest/abd/pelvis from [facility]    2. Pain clinic referral    3. A physical therapy and occupational therapy order was provided for you today.  You can schedule physical therapy before you leave today or will be contacted by physical therapy.  If nobody contacts you within 3 to 5 days, please contact the clinic at [phone].  It will be very important for you to do your physical therapy exercises on a regular basis to decrease your pain and prevent future pain flares.    4. Meloxicam (which is an anti-inflammatory) medication is prescribed today. Take 1-2 tablets daily as needed for pain. This medication should be taken with food and water to prevent any stomach upset. Do not take ibuprofen/Advil/Motrin/Aleve/naproxen while you take meloxicam. Please call if you have any side effects.
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
## 2021-08-05 ENCOUNTER — MYC MEDICAL ADVICE (OUTPATIENT)
Dept: PHYSICAL MEDICINE AND REHAB | Facility: CLINIC | Age: 41
End: 2021-08-05

## 2021-08-05 NOTE — TELEPHONE ENCOUNTER
"Phone call to patient to discuss PSP's response to Surreal Gamest message. Information reviewed in full with her. She asks what she can do to move things along. Explained she can call [facility] and check if they have worked on the request for her records and imaging. She could get a disc made of the MRIs and bring to our clinic for PSP's review.     She does report she \"had all the testing at [facility]. I saw a psychologist there. Does that count?\" Explained it may be able to be used if it is recent, but not certain on this.     She is informed that she will be contacted once PSP has received and reviewed the needed records. She will be notified of any further recommendations at that time.   "

## 2021-08-05 NOTE — TELEPHONE ENCOUNTER
The imaging reports have been received, however we are awaiting the actual images for my review.    The behavioral health evaluation is a specific evaluation regarding spinal cord stimulator.  I am not sure if her mental health/psychologist provider has made any comments regarding spinal cord stimulator.    We can also try to expedite her referral to pain.

## 2021-08-05 NOTE — TELEPHONE ENCOUNTER
Please contact the patient and discuss with her that I am still attempting to obtain the MRI images that were done of her cervical and lumbar spine.  I cannot make formal recommendations regarding potential spinal cord stimulator procedure without seeing the imaging and we would likely need a thoracic spine MRI as well.  Spinal cord stimulator procedure is a long process and she would need to see one of our behavioral health specialist as part of the insurance coverage process.  Again, I'm not sure that she would be a good candidate as have not yet seen her imaging.    I would recommend that she see the pain clinic for pain management.  Referral was placed at her appointment.  Until then, I would recommend she continue to receive her medications through her prescribing provider or primary care provider.  I have only seen her on 1 occasion, have not seen her imaging, and do not prescribe opioids for chronic pain.    We will contact her once I have reviewed the images of her cervical and lumbar spine and make further recommendations.  If she feels that she needs to proceed with spinal cord stimulator immediately, she should return to the [facility] pain clinic.

## 2021-08-05 NOTE — TELEPHONE ENCOUNTER
Patient calling back States she just spoke with [facility] medical records. She was told the records were sent to our clinic today at 1130 AM. Wants to make sure they have been received. Explained PSP will be notified.

## 2021-08-06 NOTE — TELEPHONE ENCOUNTER
Please see if we can get the psychologist record discussing the spinal cord stimulator and scanned into her chart.

## 2021-08-06 NOTE — TELEPHONE ENCOUNTER
"Patient returned call. Explained PSP is still awaiting the actual images of her spine. Stated understanding.     She does report that the psychologist did discuss the SCS. \"I literally had the whole work up for the SCS done at [facility] and was set up to have it implanted when I realized it was out of network for me. It would have cost me a ton more money if I continued there.\"     Contact information provided for the [facility].   "

## 2021-08-10 NOTE — TELEPHONE ENCOUNTER
Phone call to patient to discuss. States she requested all of her records from [facility] be sent in regards to the SCS. Inquiring if records have been received yet.    Noted that she has an appointment with PSP tomorrow AM.

## 2021-08-11 ENCOUNTER — TELEPHONE (OUTPATIENT)
Dept: PHYSICAL MEDICINE AND REHAB | Facility: CLINIC | Age: 41
End: 2021-08-11

## 2021-08-11 ENCOUNTER — OFFICE VISIT (OUTPATIENT)
Dept: PHYSICAL MEDICINE AND REHAB | Facility: CLINIC | Age: 41
End: 2021-08-11
Payer: COMMERCIAL

## 2021-08-11 VITALS — SYSTOLIC BLOOD PRESSURE: 121 MMHG | HEART RATE: 94 BPM | DIASTOLIC BLOOD PRESSURE: 76 MMHG

## 2021-08-11 DIAGNOSIS — M47.816 LUMBAR SPONDYLOSIS: ICD-10-CM

## 2021-08-11 DIAGNOSIS — G89.21 CHRONIC PAIN DUE TO TRAUMA: Primary | ICD-10-CM

## 2021-08-11 DIAGNOSIS — G89.4 CHRONIC PAIN SYNDROME: Primary | ICD-10-CM

## 2021-08-11 DIAGNOSIS — M50.20 CERVICAL DISC HERNIATION: ICD-10-CM

## 2021-08-11 DIAGNOSIS — R20.2 PARESTHESIA OF ARM: ICD-10-CM

## 2021-08-11 DIAGNOSIS — M79.18 MYOFASCIAL PAIN: ICD-10-CM

## 2021-08-11 DIAGNOSIS — M54.50 LUMBAR SPINE PAIN: ICD-10-CM

## 2021-08-11 DIAGNOSIS — M54.6 PAIN IN THORACIC SPINE: ICD-10-CM

## 2021-08-11 DIAGNOSIS — M54.12 CERVICAL RADICULAR PAIN: ICD-10-CM

## 2021-08-11 PROCEDURE — 99215 OFFICE O/P EST HI 40 MIN: CPT | Performed by: PHYSICAL MEDICINE & REHABILITATION

## 2021-08-11 RX ORDER — OXYCODONE AND ACETAMINOPHEN 5; 325 MG/1; MG/1
.5-1 TABLET ORAL 2 TIMES DAILY PRN
Qty: 28 TABLET | Refills: 0 | Status: SHIPPED | OUTPATIENT
Start: 2021-08-11 | End: 2021-08-26

## 2021-08-11 RX ORDER — OXYCODONE AND ACETAMINOPHEN 5; 325 MG/1; MG/1
TABLET ORAL
COMMUNITY
Start: 2021-05-10 | End: 2021-08-26

## 2021-08-11 ASSESSMENT — PAIN SCALES - GENERAL: PAINLEVEL: EXTREME PAIN (9)

## 2021-08-11 NOTE — TELEPHONE ENCOUNTER
Phone call to patient to discuss her treatment plan as presented per PSP. Information reviewed in full with patient. Stated understanding and appreciation for call.

## 2021-08-11 NOTE — TELEPHONE ENCOUNTER
Please contact the patient and inform her that have spoken with her primary care provider, Dr. Kathy Spencer.  The plan at this point is for me to provide oxycodone/acetaminophen 5/325, 28 tablets.  This will be a one-time prescription until the patient can be seen by Dr. Spencer and Dr. Spencer will discuss bridging the patient until seen in pain clinic.  I would recommend to the patient trying to wean down and take the minimal amount of oxycodone/acetaminophen 1/2 to 1 tablet twice daily.    I will send a prescription into her pharmacy.

## 2021-08-11 NOTE — PATIENT INSTRUCTIONS
1. EMG of your arms with Dr Ruffin    2. Neurosurgery evaluation for your neck    3. An MRI of your thoracic spine and cervical xrays was ordered for you today.  You will be contacted by scheduling within 3 days.    If you are not contacted, please call Radiology at [phone].       4. A Lumbar medial branch blocks has been ordered today. Please schedule this injection at least   2 weeks from now to allow time for insurance prior authorization. On the day of your injection, you cannot be sick or taking antibiotics. If you become sick and are prescribed, please call the clinic so your injection can be rescheduled for once you have completed your antibiotics. You will need to bring a  with you for your injection. If you have any questions or concerns prior to your injection, please do not hesitate to call the nurse navigation line at [phone].    5. Referral to pain management again today.  Potential for a Spinal cord stimulator trial through the pain center
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
## 2021-08-23 ENCOUNTER — OFFICE VISIT (OUTPATIENT)
Dept: PHYSICAL MEDICINE AND REHAB | Facility: CLINIC | Age: 41
End: 2021-08-23
Attending: PHYSICAL MEDICINE & REHABILITATION
Payer: COMMERCIAL

## 2021-08-23 VITALS — SYSTOLIC BLOOD PRESSURE: 123 MMHG | HEART RATE: 99 BPM | DIASTOLIC BLOOD PRESSURE: 79 MMHG

## 2021-08-23 DIAGNOSIS — R20.2 PARESTHESIA OF ARM: ICD-10-CM

## 2021-08-23 DIAGNOSIS — M54.12 CERVICAL RADICULAR PAIN: Primary | ICD-10-CM

## 2021-08-23 DIAGNOSIS — M54.50 LUMBAR SPINE PAIN: ICD-10-CM

## 2021-08-23 DIAGNOSIS — M50.20 CERVICAL DISC HERNIATION: ICD-10-CM

## 2021-08-23 DIAGNOSIS — R20.2 RIGHT LEG PARESTHESIAS: ICD-10-CM

## 2021-08-23 DIAGNOSIS — M79.18 MYOFASCIAL PAIN: ICD-10-CM

## 2021-08-23 PROCEDURE — 95912 NRV CNDJ TEST 11-12 STUDIES: CPT | Performed by: PHYSICAL MEDICINE & REHABILITATION

## 2021-08-23 PROCEDURE — 95886 MUSC TEST DONE W/N TEST COMP: CPT | Mod: RT | Performed by: PHYSICAL MEDICINE & REHABILITATION

## 2021-08-23 PROCEDURE — 99214 OFFICE O/P EST MOD 30 MIN: CPT | Mod: 25 | Performed by: PHYSICAL MEDICINE & REHABILITATION

## 2021-08-23 ASSESSMENT — PAIN SCALES - GENERAL: PAINLEVEL: WORST PAIN (10)
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
## 2021-08-23 NOTE — PATIENT INSTRUCTIONS
1. See Neurosurgery for your neck pain    2. Blood work will be ordered to make sure that you don't have an inflammatory issue causing your pain    3. AnEMG of you right leg will be ordered and can be completed by Dr Ruffin     4. Keep your appointment for Lumbar medial branch blocks
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
## 2021-08-25 ENCOUNTER — HOSPITAL ENCOUNTER (OUTPATIENT)
Dept: OCCUPATIONAL THERAPY | Facility: REHABILITATION | Age: 41
End: 2021-08-25
Attending: PHYSICAL MEDICINE & REHABILITATION
Payer: COMMERCIAL

## 2021-08-25 DIAGNOSIS — G89.21 CHRONIC PAIN DUE TO TRAUMA: ICD-10-CM

## 2021-08-25 DIAGNOSIS — Z78.9 DECREASED ACTIVITIES OF DAILY LIVING (ADL): Primary | ICD-10-CM

## 2021-08-25 DIAGNOSIS — Z78.9 IMPAIRED INSTRUMENTAL ACTIVITIES OF DAILY LIVING (IADL): ICD-10-CM

## 2021-08-25 PROCEDURE — 97112 NEUROMUSCULAR REEDUCATION: CPT | Mod: GO | Performed by: OCCUPATIONAL THERAPIST

## 2021-08-25 PROCEDURE — 97165 OT EVAL LOW COMPLEX 30 MIN: CPT | Mod: GO | Performed by: OCCUPATIONAL THERAPIST

## 2021-08-26 ENCOUNTER — OFFICE VISIT (OUTPATIENT)
Dept: FAMILY MEDICINE | Facility: CLINIC | Age: 41
End: 2021-08-26
Payer: COMMERCIAL

## 2021-08-26 VITALS
SYSTOLIC BLOOD PRESSURE: 124 MMHG | DIASTOLIC BLOOD PRESSURE: 84 MMHG | HEART RATE: 88 BPM | BODY MASS INDEX: 27.92 KG/M2 | WEIGHT: 184.25 LBS | HEIGHT: 68 IN

## 2021-08-26 DIAGNOSIS — Z00.00 ROUTINE GENERAL MEDICAL EXAMINATION AT A HEALTH CARE FACILITY: Primary | ICD-10-CM

## 2021-08-26 DIAGNOSIS — F45.1 SOMATIC SYMPTOM DISORDER, PERSISTENT, MODERATE, WITH PREDOMINANT PAIN: ICD-10-CM

## 2021-08-26 DIAGNOSIS — M25.532 LEFT WRIST PAIN: ICD-10-CM

## 2021-08-26 DIAGNOSIS — N95.1 MENOPAUSAL SYNDROME (HOT FLASHES): ICD-10-CM

## 2021-08-26 DIAGNOSIS — M47.816 LUMBAR SPONDYLOSIS: ICD-10-CM

## 2021-08-26 DIAGNOSIS — G89.21 CHRONIC PAIN DUE TO TRAUMA: ICD-10-CM

## 2021-08-26 DIAGNOSIS — Z13.220 LIPID SCREENING: ICD-10-CM

## 2021-08-26 DIAGNOSIS — F07.81 POST CONCUSSIVE SYNDROME: ICD-10-CM

## 2021-08-26 DIAGNOSIS — M79.18 MYOFASCIAL PAIN: ICD-10-CM

## 2021-08-26 DIAGNOSIS — S06.9X9D MILD TRAUMATIC BRAIN INJURY WITH LOSS OF CONSCIOUSNESS, SUBSEQUENT ENCOUNTER: ICD-10-CM

## 2021-08-26 DIAGNOSIS — G89.4 CHRONIC PAIN SYNDROME: ICD-10-CM

## 2021-08-26 DIAGNOSIS — F41.1 GAD (GENERALIZED ANXIETY DISORDER): ICD-10-CM

## 2021-08-26 DIAGNOSIS — N80.9 ENDOMETRIOSIS: ICD-10-CM

## 2021-08-26 PROBLEM — K80.00 CALCULUS OF GALLBLADDER WITH ACUTE CHOLECYSTITIS WITHOUT OBSTRUCTION: Status: RESOLVED | Noted: 2019-01-02 | Resolved: 2021-08-26

## 2021-08-26 LAB
ALBUMIN SERPL-MCNC: 3.8 G/DL (ref 3.5–5)
ALP SERPL-CCNC: 63 U/L (ref 45–120)
ALT SERPL W P-5'-P-CCNC: 12 U/L (ref 0–45)
ANION GAP SERPL CALCULATED.3IONS-SCNC: 11 MMOL/L (ref 5–18)
AST SERPL W P-5'-P-CCNC: 14 U/L (ref 0–40)
BILIRUB SERPL-MCNC: 0.2 MG/DL (ref 0–1)
BUN SERPL-MCNC: 7 MG/DL (ref 8–22)
C REACTIVE PROTEIN LHE: 0.3 MG/DL (ref 0–0.8)
CALCIUM SERPL-MCNC: 8.9 MG/DL (ref 8.5–10.5)
CHLORIDE BLD-SCNC: 104 MMOL/L (ref 98–107)
CHOLEST SERPL-MCNC: 218 MG/DL
CK SERPL-CCNC: 77 U/L (ref 30–190)
CO2 SERPL-SCNC: 24 MMOL/L (ref 22–31)
CREAT SERPL-MCNC: 0.9 MG/DL (ref 0.6–1.1)
ERYTHROCYTE [DISTWIDTH] IN BLOOD BY AUTOMATED COUNT: 14.7 % (ref 10–15)
ERYTHROCYTE [SEDIMENTATION RATE] IN BLOOD BY WESTERGREN METHOD: 20 MM/HR (ref 0–20)
FASTING STATUS PATIENT QL REPORTED: YES
GFR SERPL CREATININE-BSD FRML MDRD: 79 ML/MIN/1.73M2
GLUCOSE BLD-MCNC: 91 MG/DL (ref 70–125)
HCT VFR BLD AUTO: 33.4 % (ref 35–47)
HDLC SERPL-MCNC: 73 MG/DL
HGB BLD-MCNC: 10.6 G/DL (ref 11.7–15.7)
LDLC SERPL CALC-MCNC: 130 MG/DL
MCH RBC QN AUTO: 26.5 PG (ref 26.5–33)
MCHC RBC AUTO-ENTMCNC: 31.7 G/DL (ref 31.5–36.5)
MCV RBC AUTO: 84 FL (ref 78–100)
PLATELET # BLD AUTO: 234 10E3/UL (ref 150–450)
POTASSIUM BLD-SCNC: 4.6 MMOL/L (ref 3.5–5)
PROT SERPL-MCNC: 6.5 G/DL (ref 6–8)
RBC # BLD AUTO: 4 10E6/UL (ref 3.8–5.2)
RHEUMATOID FACT SER NEPH-ACNC: <15 IU/ML (ref 0–30)
SODIUM SERPL-SCNC: 139 MMOL/L (ref 136–145)
TRIGL SERPL-MCNC: 75 MG/DL
TSH SERPL DL<=0.005 MIU/L-ACNC: 0.81 UIU/ML (ref 0.3–5)
WBC # BLD AUTO: 5.3 10E3/UL (ref 4–11)

## 2021-08-26 PROCEDURE — 82550 ASSAY OF CK (CPK): CPT | Performed by: FAMILY MEDICINE

## 2021-08-26 PROCEDURE — 99396 PREV VISIT EST AGE 40-64: CPT | Performed by: FAMILY MEDICINE

## 2021-08-26 PROCEDURE — 86038 ANTINUCLEAR ANTIBODIES: CPT | Performed by: FAMILY MEDICINE

## 2021-08-26 PROCEDURE — 85027 COMPLETE CBC AUTOMATED: CPT | Performed by: FAMILY MEDICINE

## 2021-08-26 PROCEDURE — 80053 COMPREHEN METABOLIC PANEL: CPT | Performed by: FAMILY MEDICINE

## 2021-08-26 PROCEDURE — 83001 ASSAY OF GONADOTROPIN (FSH): CPT | Performed by: FAMILY MEDICINE

## 2021-08-26 PROCEDURE — 86235 NUCLEAR ANTIGEN ANTIBODY: CPT | Mod: 59 | Performed by: FAMILY MEDICINE

## 2021-08-26 PROCEDURE — 86141 C-REACTIVE PROTEIN HS: CPT | Performed by: FAMILY MEDICINE

## 2021-08-26 PROCEDURE — 85652 RBC SED RATE AUTOMATED: CPT | Performed by: FAMILY MEDICINE

## 2021-08-26 PROCEDURE — 86618 LYME DISEASE ANTIBODY: CPT | Performed by: FAMILY MEDICINE

## 2021-08-26 PROCEDURE — 80061 LIPID PANEL: CPT | Performed by: FAMILY MEDICINE

## 2021-08-26 PROCEDURE — 99214 OFFICE O/P EST MOD 30 MIN: CPT | Mod: 25 | Performed by: FAMILY MEDICINE

## 2021-08-26 PROCEDURE — 36415 COLL VENOUS BLD VENIPUNCTURE: CPT | Performed by: FAMILY MEDICINE

## 2021-08-26 PROCEDURE — 84443 ASSAY THYROID STIM HORMONE: CPT | Performed by: FAMILY MEDICINE

## 2021-08-26 PROCEDURE — 86200 CCP ANTIBODY: CPT | Performed by: FAMILY MEDICINE

## 2021-08-26 PROCEDURE — 86431 RHEUMATOID FACTOR QUANT: CPT | Performed by: FAMILY MEDICINE

## 2021-08-26 PROCEDURE — 86235 NUCLEAR ANTIGEN ANTIBODY: CPT | Performed by: FAMILY MEDICINE

## 2021-08-26 RX ORDER — OXYCODONE AND ACETAMINOPHEN 5; 325 MG/1; MG/1
.5-1 TABLET ORAL 2 TIMES DAILY PRN
Qty: 28 TABLET | Refills: 0 | Status: SHIPPED | OUTPATIENT
Start: 2021-08-26 | End: 2021-09-21

## 2021-08-26 ASSESSMENT — PATIENT HEALTH QUESTIONNAIRE - PHQ9
SUM OF ALL RESPONSES TO PHQ QUESTIONS 1-9: 17
5. POOR APPETITE OR OVEREATING: NEARLY EVERY DAY
10. IF YOU CHECKED OFF ANY PROBLEMS, HOW DIFFICULT HAVE THESE PROBLEMS MADE IT FOR YOU TO DO YOUR WORK, TAKE CARE OF THINGS AT HOME, OR GET ALONG WITH OTHER PEOPLE: VERY DIFFICULT
SUM OF ALL RESPONSES TO PHQ QUESTIONS 1-9: 17

## 2021-08-26 ASSESSMENT — ANXIETY QUESTIONNAIRES
2. NOT BEING ABLE TO STOP OR CONTROL WORRYING: NEARLY EVERY DAY
5. BEING SO RESTLESS THAT IT IS HARD TO SIT STILL: MORE THAN HALF THE DAYS
GAD7 TOTAL SCORE: 16
6. BECOMING EASILY ANNOYED OR IRRITABLE: MORE THAN HALF THE DAYS
1. FEELING NERVOUS, ANXIOUS, OR ON EDGE: NEARLY EVERY DAY
IF YOU CHECKED OFF ANY PROBLEMS ON THIS QUESTIONNAIRE, HOW DIFFICULT HAVE THESE PROBLEMS MADE IT FOR YOU TO DO YOUR WORK, TAKE CARE OF THINGS AT HOME, OR GET ALONG WITH OTHER PEOPLE: EXTREMELY DIFFICULT
3. WORRYING TOO MUCH ABOUT DIFFERENT THINGS: NEARLY EVERY DAY
7. FEELING AFRAID AS IF SOMETHING AWFUL MIGHT HAPPEN: NOT AT ALL

## 2021-08-26 ASSESSMENT — MIFFLIN-ST. JEOR: SCORE: 1539.25
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
## 2021-08-27 ENCOUNTER — ANCILLARY PROCEDURE (OUTPATIENT)
Dept: PHYSICAL MEDICINE AND REHAB | Facility: CLINIC | Age: 41
End: 2021-08-27
Attending: PHYSICAL MEDICINE & REHABILITATION
Payer: COMMERCIAL

## 2021-08-27 VITALS
DIASTOLIC BLOOD PRESSURE: 76 MMHG | OXYGEN SATURATION: 96 % | SYSTOLIC BLOOD PRESSURE: 114 MMHG | HEART RATE: 95 BPM | TEMPERATURE: 98.3 F

## 2021-08-27 DIAGNOSIS — M47.816 LUMBAR SPONDYLOSIS: ICD-10-CM

## 2021-08-27 LAB
B BURGDOR IGG+IGM SER QL: 0.05
CCP AB SER IA-ACNC: <0.5 U/ML
FSH SERPL-ACNC: 6 MIU/ML

## 2021-08-27 PROCEDURE — 99207 PR DROP WITH A PROCEDURE: CPT | Mod: 25 | Performed by: PAIN MEDICINE

## 2021-08-27 PROCEDURE — 64493 INJ PARAVERT F JNT L/S 1 LEV: CPT | Mod: 50 | Performed by: PAIN MEDICINE

## 2021-08-27 RX ORDER — BUPIVACAINE HYDROCHLORIDE 7.5 MG/ML
INJECTION, SOLUTION EPIDURAL; RETROBULBAR
Status: COMPLETED | OUTPATIENT
Start: 2021-08-27 | End: 2021-08-27

## 2021-08-27 RX ORDER — LIDOCAINE HYDROCHLORIDE 10 MG/ML
INJECTION, SOLUTION EPIDURAL; INFILTRATION; INTRACAUDAL; PERINEURAL
Status: COMPLETED | OUTPATIENT
Start: 2021-08-27 | End: 2021-08-27

## 2021-08-27 RX ADMIN — LIDOCAINE HYDROCHLORIDE 4 ML: 10 INJECTION, SOLUTION EPIDURAL; INFILTRATION; INTRACAUDAL; PERINEURAL at 10:55

## 2021-08-27 RX ADMIN — BUPIVACAINE HYDROCHLORIDE 2 ML: 7.5 INJECTION, SOLUTION EPIDURAL; RETROBULBAR at 10:55

## 2021-08-27 ASSESSMENT — PAIN SCALES - GENERAL
PAINLEVEL: WORST PAIN (10)
PAINLEVEL: EXTREME PAIN (9)

## 2021-08-27 ASSESSMENT — ANXIETY QUESTIONNAIRES: GAD7 TOTAL SCORE: 16
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
## 2021-08-27 NOTE — LETTER
8/27/2021         RE: Alex Feldman  [address] [facility] [id number]        Dear Colleague,    Thank you for referring your patient, Alex Feldman, to the [facility] [place]. Please see a copy of my visit note below.    .      Again, thank you for allowing me to participate in the care of your patient.        Sincerely,        Joey Rivera, DO

## 2021-08-27 NOTE — PATIENT INSTRUCTIONS
Please complete your pain diary and return the diary to the [facility] at your earliest convenience.  The [facility] will contact you to schedule your next appointment after your pain diary is reviewed by your doctor.  Thank you.    DISCHARGE INSTRUCTIONS    During office hours (8:00 a.m.- 4:00 p.m.) questions or concerns may be answered  by calling [facility] Center Navigation Nurses at  [phone].  Messages received after hours will be returned the following business day.      In the case of an emergency, please dial 911 or seek assistance at the nearest Emergency Room/Urgent Care facility.     All Patients:  ? You may experience an increase in your symptoms for the first 2 days, once the numbing medication wears off.    ? You may resume your regular medication, no pain medication until you have completed your diary    ? You may shower. No swimming, tub bath or hot tub for 24 hours; remove bandage after 4 hours    ? Continue your activities that can cause you pain to test the blocks.                         ? You should not drive for the next 3 to 5 hours (have someone drive you)           POSSIBLE PROCEDURE SIDE EFFECTS  -Call Spine Center if concerned-    Increased Pain  Increased numbness/tingling     Nausea/Vomiting  Bruising/bleeding at site (hematoma)             Swelling at site (edema) Headache  Difficulty walking  Infection        Fever greater than 100.5

## 2021-08-30 LAB
ANA SER QL IF: NEGATIVE
ENA SS-A AB SER IA-ACNC: <0.5 U/ML
ENA SS-A AB SER IA-ACNC: NEGATIVE
ENA SS-B IGG SER IA-ACNC: <0.6 U/ML
ENA SS-B IGG SER IA-ACNC: NEGATIVE

## 2021-09-03 ENCOUNTER — HOSPITAL ENCOUNTER (OUTPATIENT)
Dept: MRI IMAGING | Facility: HOSPITAL | Age: 41
End: 2021-09-03
Attending: PHYSICAL MEDICINE & REHABILITATION
Payer: COMMERCIAL

## 2021-09-03 ENCOUNTER — HOSPITAL ENCOUNTER (OUTPATIENT)
Dept: RADIOLOGY | Facility: HOSPITAL | Age: 41
End: 2021-09-03
Attending: PHYSICAL MEDICINE & REHABILITATION
Payer: COMMERCIAL

## 2021-09-03 DIAGNOSIS — M50.20 CERVICAL DISC HERNIATION: ICD-10-CM

## 2021-09-03 DIAGNOSIS — M54.12 CERVICAL RADICULAR PAIN: ICD-10-CM

## 2021-09-03 DIAGNOSIS — M54.6 PAIN IN THORACIC SPINE: ICD-10-CM

## 2021-09-03 PROCEDURE — 72040 X-RAY EXAM NECK SPINE 2-3 VW: CPT

## 2021-09-03 PROCEDURE — 72146 MRI CHEST SPINE W/O DYE: CPT

## 2021-09-07 ENCOUNTER — TELEPHONE (OUTPATIENT)
Dept: PHYSICAL MEDICINE AND REHAB | Facility: CLINIC | Age: 41
End: 2021-09-07

## 2021-09-07 NOTE — TELEPHONE ENCOUNTER
"Received message that pt had not viewed results in [facility],   \"All lab work checked from our office including inflammatory markers of ESR and CRP, rheumatoid factor CK, CCP,Lyme titer, ERWIN to check for lupus and Sjogren's antibodies are all negative/normal.     Continue plan EMG/nerve test of your right leg.\"    Call placed to pt. Unable to reach pt at this time. Pt is scheduled for EMG on 9/10.   "

## 2021-09-08 ENCOUNTER — TELEPHONE (OUTPATIENT)
Dept: PHYSICAL MEDICINE AND REHAB | Facility: CLINIC | Age: 41
End: 2021-09-08

## 2021-09-08 NOTE — TELEPHONE ENCOUNTER
Attempted to contact patient, but she was unreachable at this time.  Message left for patient informing her that Dr. Ruffin was recommending that she schedule an appointment with [facility] given her negative response to the lumbar medial branch blocks she recently had completed at the [facility].   The phone number for neurosurgery was provided in the message.    She was also reminded of the EMG appointment she had on 9/10/21 with Dr. Ruffin.    The patient was encouraged to contact the [facility] Center if she had any questions or concerns prior to her next appointment.

## 2021-09-14 ENCOUNTER — LAB (OUTPATIENT)
Dept: LAB | Facility: HOSPITAL | Age: 41
End: 2021-09-14
Payer: COMMERCIAL

## 2021-09-14 ENCOUNTER — OFFICE VISIT (OUTPATIENT)
Dept: NEUROLOGY | Facility: CLINIC | Age: 41
End: 2021-09-14
Attending: FAMILY MEDICINE
Payer: COMMERCIAL

## 2021-09-14 VITALS
SYSTOLIC BLOOD PRESSURE: 119 MMHG | WEIGHT: 184 LBS | BODY MASS INDEX: 27.89 KG/M2 | HEIGHT: 68 IN | HEART RATE: 90 BPM | DIASTOLIC BLOOD PRESSURE: 68 MMHG

## 2021-09-14 DIAGNOSIS — R41.89 SPELL OF ALTERED COGNITION: ICD-10-CM

## 2021-09-14 DIAGNOSIS — S06.9X9D MILD TRAUMATIC BRAIN INJURY WITH LOSS OF CONSCIOUSNESS, SUBSEQUENT ENCOUNTER: ICD-10-CM

## 2021-09-14 DIAGNOSIS — R25.1 TREMOR: ICD-10-CM

## 2021-09-14 DIAGNOSIS — R41.89 SUBJECTIVE MEMORY COMPLAINTS: ICD-10-CM

## 2021-09-14 DIAGNOSIS — G43.809 OTHER MIGRAINE WITHOUT STATUS MIGRAINOSUS, NOT INTRACTABLE: ICD-10-CM

## 2021-09-14 DIAGNOSIS — R41.89 SUBJECTIVE MEMORY COMPLAINTS: Primary | ICD-10-CM

## 2021-09-14 LAB — VIT B12 SERPL-MCNC: 1772 PG/ML (ref 213–816)

## 2021-09-14 PROCEDURE — 36415 COLL VENOUS BLD VENIPUNCTURE: CPT

## 2021-09-14 PROCEDURE — 84425 ASSAY OF VITAMIN B-1: CPT

## 2021-09-14 PROCEDURE — 99205 OFFICE O/P NEW HI 60 MIN: CPT | Performed by: PSYCHIATRY & NEUROLOGY

## 2021-09-14 PROCEDURE — 82607 VITAMIN B-12: CPT

## 2021-09-14 PROCEDURE — 83921 ORGANIC ACID SINGLE QUANT: CPT

## 2021-09-14 RX ORDER — UBIDECARENONE 75 MG
100 CAPSULE ORAL DAILY
COMMUNITY
End: 2021-12-20

## 2021-09-14 RX ORDER — RIZATRIPTAN BENZOATE 10 MG/1
10 TABLET ORAL
Qty: 18 TABLET | Refills: 1 | Status: SHIPPED | OUTPATIENT
Start: 2021-09-14 | End: 2021-11-02

## 2021-09-14 RX ORDER — CHLORAL HYDRATE 500 MG
2 CAPSULE ORAL DAILY
COMMUNITY
End: 2021-12-20

## 2021-09-14 RX ORDER — GABAPENTIN 300 MG/1
300 CAPSULE ORAL AT BEDTIME
Qty: 90 CAPSULE | Refills: 1 | Status: SHIPPED | OUTPATIENT
Start: 2021-09-14 | End: 2021-12-15

## 2021-09-14 ASSESSMENT — MONTREAL COGNITIVE ASSESSMENT (MOCA)
7. [VIGILENCE] TAP WHEN HEARING DESIGNATED LETTER: 1
WHAT LEVEL OF EDUCATION WAS ATTAINED: 1
8. SERIAL SUBTRACTION OF 7S: 1
12. MEMORY INDEX SCORE: 1
10. [FLUENCY] NAME WORDS STARTING WITH DESIGNATED LETTER: 0
6. READ LIST OF DIGITS [FORWARD/BACKWARD]: 2
13. ORIENTATION SUBSCORE: 5
VISUOSPATIAL/EXECUTIVE SUBSCORE: 2
9. REPEAT EACH SENTENCE: 2
WHAT IS THE TOTAL SCORE (OUT OF 30): 20
4. NAME EACH OF THE THREE ANIMALS SHOWN: 3
11. FOR EACH PAIR OF WORDS, WHAT CATEGORY DO THEY BELONG TO (OUT OF 2): 2

## 2021-09-14 ASSESSMENT — MIFFLIN-ST. JEOR: SCORE: 1538.12

## 2021-09-14 NOTE — NURSING NOTE
SIMRAN COGNITIVE ASSESSMENT (MOCA)  Version 7.1 Original Version  VISUOSPATIAL/EXECUTIVE               COPY CUBE      [ 0  ]                                [  0  ] DRAW CLOCK (Ten past eleven)  (3 points)    [1    ]                    [  0  ]               [1    ]       Contour            Numbers     Hands POINTS                2   / 5   NAMING    [ 1  ]                                                                        [   1 ]                                             [ 1   ]  Lirafia Kennedy                                Camel                 3    / 3   MEMORY Read list of words, subject must repeat them. Do 2 trials, even if 1st trial is successful. Do a recall after 5 minutes  FACE VELVET Denominational ELINA RED No Points    1st   x  x     2nd   x x x    ATTENTION Read list of digits (1 digit/sec) Subject has to repeat in the forward order       [ 1   ]   2  1  8  5  4                                [ 1   ] 7 4 2                      2    /2   Read list of letters. The subject must tap with his hand at each letter A. No points if > 2 errors.  [1    ] F B A C M N A A J K L B A F A K D E A A A J A M O F A A B             1 /1   Serial 7 subtraction starting at 100          [1    ] 93         [ 0   ] 86          [    ] 79          [    ] 72         [    ] 65   4 or 5 correct subtractions: 3 points,  2 or 3 correct: 2 points,  1correct: 1 point,   0 correct: 0 points          1  /3   LANGUAGE Repeat: I only know that Tong is the one to help today. [  1   ]                                      The cat always hid under the couch when dogs were in the room. [ 1  ]           2    /2   Fluency: Name maximum number of words in one minute that begin with the letter F                                                                                                                    [  0  ] _10__ (N > 11 words)          0     /1   ABSTRACTION Similarity  between e.g. banana-orange=fruit                                                                   [ 1   ] train-bicycle                      [ 1   ] watch-ruler           2   /2   DELAYED  RECALL Has to recall words  WITH NO CUE FACE  [    ] VELVET  [ 1   ] Tenriism  [    ]  ELINA  [    ] RED  [    ] Points for UNCUED recall only         1   /5           OPTIONAL Category cue           Multiple choice cue          ORIENTATION  [0    ] Date     [ 1   ] Month       [ 1   ] Year      [  1  ] Day      [ 1   ] Place        [ 1   ] City        5  /6   TOTAL  Normal > 26/30 Add 1 point if < 12 years education     20 /30
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
## 2021-09-17 LAB — METHYLMALONATE SERPL-SCNC: 0.13 UMOL/L (ref 0–0.4)

## 2021-09-18 LAB — VIT B1 PYROPHOSHATE BLD-SCNC: 94 NMOL/L

## 2021-09-20 ENCOUNTER — OFFICE VISIT (OUTPATIENT)
Dept: PHYSICAL MEDICINE AND REHAB | Facility: CLINIC | Age: 41
End: 2021-09-20
Payer: COMMERCIAL

## 2021-09-20 ENCOUNTER — MYC REFILL (OUTPATIENT)
Dept: FAMILY MEDICINE | Facility: CLINIC | Age: 41
End: 2021-09-20

## 2021-09-20 ENCOUNTER — MYC MEDICAL ADVICE (OUTPATIENT)
Dept: FAMILY MEDICINE | Facility: CLINIC | Age: 41
End: 2021-09-20

## 2021-09-20 VITALS — SYSTOLIC BLOOD PRESSURE: 127 MMHG | DIASTOLIC BLOOD PRESSURE: 79 MMHG | HEART RATE: 102 BPM

## 2021-09-20 DIAGNOSIS — M50.20 CERVICAL DISC HERNIATION: ICD-10-CM

## 2021-09-20 DIAGNOSIS — M47.816 LUMBAR SPONDYLOSIS: ICD-10-CM

## 2021-09-20 DIAGNOSIS — M79.645 PAIN OF LEFT THUMB: ICD-10-CM

## 2021-09-20 DIAGNOSIS — G89.4 CHRONIC PAIN SYNDROME: ICD-10-CM

## 2021-09-20 DIAGNOSIS — R20.2 RIGHT LEG PARESTHESIAS: Primary | ICD-10-CM

## 2021-09-20 DIAGNOSIS — M79.18 MYOFASCIAL PAIN: ICD-10-CM

## 2021-09-20 PROCEDURE — 99214 OFFICE O/P EST MOD 30 MIN: CPT | Mod: 25 | Performed by: PHYSICAL MEDICINE & REHABILITATION

## 2021-09-20 PROCEDURE — 95909 NRV CNDJ TST 5-6 STUDIES: CPT | Performed by: PHYSICAL MEDICINE & REHABILITATION

## 2021-09-20 PROCEDURE — 95886 MUSC TEST DONE W/N TEST COMP: CPT | Mod: RT | Performed by: PHYSICAL MEDICINE & REHABILITATION

## 2021-09-20 RX ORDER — OXYCODONE AND ACETAMINOPHEN 5; 325 MG/1; MG/1
.5-1 TABLET ORAL 2 TIMES DAILY PRN
Qty: 28 TABLET | Refills: 0 | Status: CANCELLED | OUTPATIENT
Start: 2021-09-20

## 2021-09-20 ASSESSMENT — PAIN SCALES - GENERAL: PAINLEVEL: WORST PAIN (10)

## 2021-09-20 NOTE — PATIENT INSTRUCTIONS
1. See Neurosurgery for your neck.  An appointment has been made for 2:00pm on this Thursday 9/23/2021 with Dr Parekh    2. An xray was ordered for you today.  You will be contacted by scheduling within 3 days.    If you are not contacted, please call Radiology at [phone].       3. Please see orthopedics ([place]) for your left hand.
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
## 2021-09-20 NOTE — PROGRESS NOTES
Patient presents at the request of Dr. Skip Ruffin for right lower extremity EMG.  She has low back pain with right lower extremity pain and paresthesias.  On exam she has normal strength sensation and reflexes in the right lower extremity.    EMG/NCS  results: Please see scanned full report.    Comment NCS: Normal study  1.  Normal nerve conduction studies right lower extremity.  This includes normal right sural SNAP, peroneal and tibial CMAPs, and symmetric tibial H reflexes.    Comment EMG: Normal study  1.  Normal needle EMG right lower extremity.    Interpretation: Normal study    1. There is no electrodiagnostic evidence of lumbosacral radiculopathy, lumbosacral plexopathy, or focal neuropathy in the right lower extremity.  Specifically, there is no electrodiagnostic evidence of L4-S1 radiculopathy lower extremity.  In the right lower extremity.    The testing was completed in its entirety by the physician.      It was our pleasure caring for your patient today, if there any questions or concerns please do not hesitate to contact us.

## 2021-09-21 RX ORDER — OXYCODONE AND ACETAMINOPHEN 5; 325 MG/1; MG/1
.5-1 TABLET ORAL 2 TIMES DAILY PRN
Qty: 28 TABLET | Refills: 0 | Status: SHIPPED | OUTPATIENT
Start: 2021-09-21 | End: 2021-10-11

## 2021-09-21 NOTE — TELEPHONE ENCOUNTER
Duplicate Request.  Closing encounter at this time.    Caro Ayon RN   09/20/21 10:42 PM  [facility] Nurse Advisor

## 2021-09-22 ENCOUNTER — HOSPITAL ENCOUNTER (OUTPATIENT)
Dept: OCCUPATIONAL THERAPY | Facility: REHABILITATION | Age: 41
End: 2021-09-22
Payer: COMMERCIAL

## 2021-09-22 DIAGNOSIS — Z78.9 IMPAIRED INSTRUMENTAL ACTIVITIES OF DAILY LIVING (IADL): ICD-10-CM

## 2021-09-22 DIAGNOSIS — G89.21 CHRONIC PAIN DUE TO TRAUMA: ICD-10-CM

## 2021-09-22 DIAGNOSIS — Z78.9 DECREASED ACTIVITIES OF DAILY LIVING (ADL): Primary | ICD-10-CM

## 2021-09-22 PROCEDURE — 97535 SELF CARE MNGMENT TRAINING: CPT | Mod: GO | Performed by: OCCUPATIONAL THERAPIST

## 2021-09-23 ENCOUNTER — OFFICE VISIT (OUTPATIENT)
Dept: NEUROSURGERY | Facility: CLINIC | Age: 41
End: 2021-09-23
Payer: COMMERCIAL

## 2021-09-23 ENCOUNTER — HOSPITAL ENCOUNTER (OUTPATIENT)
Dept: RADIOLOGY | Facility: HOSPITAL | Age: 41
End: 2021-09-23
Attending: PHYSICAL MEDICINE & REHABILITATION
Payer: COMMERCIAL

## 2021-09-23 VITALS
WEIGHT: 175 LBS | HEART RATE: 81 BPM | DIASTOLIC BLOOD PRESSURE: 77 MMHG | BODY MASS INDEX: 26.52 KG/M2 | HEIGHT: 68 IN | SYSTOLIC BLOOD PRESSURE: 133 MMHG | OXYGEN SATURATION: 96 %

## 2021-09-23 DIAGNOSIS — M54.12 CERVICAL RADICULAR PAIN: ICD-10-CM

## 2021-09-23 DIAGNOSIS — M79.645 PAIN OF LEFT THUMB: ICD-10-CM

## 2021-09-23 DIAGNOSIS — R20.2 PARESTHESIA OF ARM: ICD-10-CM

## 2021-09-23 DIAGNOSIS — M50.20 CERVICAL DISC HERNIATION: ICD-10-CM

## 2021-09-23 PROCEDURE — 72040 X-RAY EXAM NECK SPINE 2-3 VW: CPT

## 2021-09-23 PROCEDURE — 99204 OFFICE O/P NEW MOD 45 MIN: CPT | Performed by: SURGERY

## 2021-09-23 PROCEDURE — 73130 X-RAY EXAM OF HAND: CPT | Mod: LT

## 2021-09-23 RX ORDER — METHOCARBAMOL 500 MG/1
500 TABLET, FILM COATED ORAL 4 TIMES DAILY PRN
Qty: 30 TABLET | Refills: 1 | Status: SHIPPED | OUTPATIENT
Start: 2021-09-23 | End: 2021-12-20

## 2021-09-23 ASSESSMENT — MIFFLIN-ST. JEOR: SCORE: 1497.29
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
## 2021-09-29 ENCOUNTER — ANCILLARY PROCEDURE (OUTPATIENT)
Dept: NEUROLOGY | Facility: CLINIC | Age: 41
End: 2021-09-29
Attending: PSYCHIATRY & NEUROLOGY
Payer: COMMERCIAL

## 2021-09-29 DIAGNOSIS — G43.809 OTHER MIGRAINE WITHOUT STATUS MIGRAINOSUS, NOT INTRACTABLE: ICD-10-CM

## 2021-09-29 PROCEDURE — 95816 EEG AWAKE AND DROWSY: CPT | Performed by: PSYCHIATRY & NEUROLOGY

## 2021-10-08 ENCOUNTER — MYC MEDICAL ADVICE (OUTPATIENT)
Dept: NEUROSURGERY | Facility: CLINIC | Age: 41
End: 2021-10-08

## 2021-10-14 ENCOUNTER — OFFICE VISIT (OUTPATIENT)
Dept: PHYSICAL MEDICINE AND REHAB | Facility: CLINIC | Age: 41
End: 2021-10-14
Payer: COMMERCIAL

## 2021-10-14 VITALS — SYSTOLIC BLOOD PRESSURE: 126 MMHG | DIASTOLIC BLOOD PRESSURE: 74 MMHG | HEART RATE: 101 BPM

## 2021-10-14 DIAGNOSIS — M50.20 CERVICAL DISC HERNIATION: Primary | ICD-10-CM

## 2021-10-14 DIAGNOSIS — M25.551 HIP PAIN, RIGHT: ICD-10-CM

## 2021-10-14 DIAGNOSIS — M79.18 MYOFASCIAL PAIN: ICD-10-CM

## 2021-10-14 DIAGNOSIS — G89.4 CHRONIC PAIN SYNDROME: ICD-10-CM

## 2021-10-14 PROCEDURE — 99214 OFFICE O/P EST MOD 30 MIN: CPT | Performed by: PHYSICAL MEDICINE & REHABILITATION

## 2021-10-14 RX ORDER — NABUMETONE 500 MG/1
500-1000 TABLET, FILM COATED ORAL 2 TIMES DAILY PRN
Qty: 90 TABLET | Refills: 1 | Status: SHIPPED | OUTPATIENT
Start: 2021-10-14 | End: 2021-11-30

## 2021-10-14 ASSESSMENT — PAIN SCALES - GENERAL: PAINLEVEL: WORST PAIN (10)
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
## 2021-10-14 NOTE — PATIENT INSTRUCTIONS
1. Nabumetone (which is an anti-inflammatory) medication is prescribed today. Take 1-2 tablets 2 times a day as needed for pain. This medication should be taken with food and water to prevent any stomach upset. Do not take ibuprofen/Advil/Motrin/Aleve/naproxen while you take Nabumetone. Please call if you have any side effects.    2. An MRI of you right hip was ordered for you today.  You will be contacted by scheduling within 3 days.    If you are not contacted, please call Radiology at [phone].       3. Orthopedic evaluation for your neck with Dr Buenrostro at [facility]    4. Pain clinic referral was placed again today

## 2021-10-16 ENCOUNTER — HEALTH MAINTENANCE LETTER (OUTPATIENT)
Age: 41
End: 2021-10-16

## 2021-10-23 DIAGNOSIS — Z11.59 ENCOUNTER FOR SCREENING FOR OTHER VIRAL DISEASES: ICD-10-CM

## 2021-10-26 LAB — PHQ9 SCORE: 20

## 2021-11-01 ENCOUNTER — OFFICE VISIT (OUTPATIENT)
Dept: NEUROLOGY | Facility: CLINIC | Age: 41
End: 2021-11-01
Payer: COMMERCIAL

## 2021-11-01 VITALS
HEIGHT: 68 IN | DIASTOLIC BLOOD PRESSURE: 77 MMHG | SYSTOLIC BLOOD PRESSURE: 119 MMHG | BODY MASS INDEX: 26.52 KG/M2 | WEIGHT: 175 LBS | HEART RATE: 94 BPM

## 2021-11-01 DIAGNOSIS — R25.1 TREMOR: ICD-10-CM

## 2021-11-01 DIAGNOSIS — G43.809 OTHER MIGRAINE WITHOUT STATUS MIGRAINOSUS, NOT INTRACTABLE: ICD-10-CM

## 2021-11-01 DIAGNOSIS — S06.9X9D MILD TRAUMATIC BRAIN INJURY WITH LOSS OF CONSCIOUSNESS, SUBSEQUENT ENCOUNTER: Primary | ICD-10-CM

## 2021-11-01 DIAGNOSIS — R41.89 SUBJECTIVE MEMORY COMPLAINTS: ICD-10-CM

## 2021-11-01 PROCEDURE — 99215 OFFICE O/P EST HI 40 MIN: CPT | Performed by: PSYCHIATRY & NEUROLOGY

## 2021-11-01 RX ORDER — TIZANIDINE 2 MG/1
2 TABLET ORAL 3 TIMES DAILY
Qty: 90 TABLET | Refills: 1 | Status: SHIPPED | OUTPATIENT
Start: 2021-11-01 | End: 2021-12-15

## 2021-11-01 ASSESSMENT — MIFFLIN-ST. JEOR: SCORE: 1497.29
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
## 2021-11-01 NOTE — NURSING NOTE
Chief Complaint   Patient presents with     Results     Discuss EEG and lab work      Kasey Bauer MA on 11/1/2021 at 2:19 PM
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
## 2021-11-02 RX ORDER — RIZATRIPTAN BENZOATE 10 MG/1
10 TABLET ORAL
Qty: 18 TABLET | Refills: 1 | Status: SHIPPED | OUTPATIENT
Start: 2021-11-02 | End: 2021-12-15

## 2021-11-02 NOTE — TELEPHONE ENCOUNTER
Refill request for rizatriptan 10 mg   Medication T'd for review and signature  Kasey Bauer MA on 11/2/2021 at 9:59 AM

## 2021-11-08 NOTE — ADDENDUM NOTE
Encounter addended by: Roselia Ruiz, OTR on: 11/8/2021 11:42 AM   Actions taken: Clinical Note Signed, Episode resolved

## 2021-11-08 NOTE — PROGRESS NOTES
[facility] Discharge Summary  Patient Name: Alex Feldman  Date: 11/8/2021  Referral Diagnosis: Chronic pain due to trauma  Referring provider: Skip Ruffin  Visit Diagnosis: (Z78.9) Decreased activities of daily living (ADL)  (primary encounter diagnosis)    (Z78.9) Impaired instrumental activities of daily living (IADL)    (G89.21) Chronic pain due to trauma      Goals:  Pt will independently verbalize and report at home utilization of 3 health management strategies to improve habits and routines for increased self-management of pain allowing increased participation in functional/everyday activities.  - NOT MET.    Patient will utilize sleep hygiene and positioning strategies independently, with visual aids and compensatory tools, to improve sleep as reported by increase in sleeping time. - NOT MET.    Patient will verbalize and demonstrate 2 stress management techniques that patient utilizes to increase self-management of pain and allow for increased participation with household tasks and dependent care. - NOT MET.    Patient was seen for 2 visits from 8/25/21 to 9/22/21 with 1 missed appointments.  The patient discontinued therapy, did not return.    Therapy will be discontinued at this time.  The patient will need a new referral to resume.    Thank you for your referral.  Roselia Ruiz, OTR  11/8/2021  11:39 AM

## 2021-11-19 ENCOUNTER — LAB (OUTPATIENT)
Dept: LAB | Facility: CLINIC | Age: 41
End: 2021-11-19
Payer: COMMERCIAL

## 2021-11-19 DIAGNOSIS — Z11.59 ENCOUNTER FOR SCREENING FOR OTHER VIRAL DISEASES: ICD-10-CM

## 2021-11-19 PROCEDURE — U0003 INFECTIOUS AGENT DETECTION BY NUCLEIC ACID (DNA OR RNA); SEVERE ACUTE RESPIRATORY SYNDROME CORONAVIRUS 2 (SARS-COV-2) (CORONAVIRUS DISEASE [COVID-19]), AMPLIFIED PROBE TECHNIQUE, MAKING USE OF HIGH THROUGHPUT TECHNOLOGIES AS DESCRIBED BY CMS-2020-01-R: HCPCS

## 2021-11-19 PROCEDURE — U0005 INFEC AGEN DETEC AMPLI PROBE: HCPCS

## 2021-11-20 LAB — SARS-COV-2 RNA RESP QL NAA+PROBE: NEGATIVE

## 2021-11-28 DIAGNOSIS — G89.4 CHRONIC PAIN SYNDROME: ICD-10-CM

## 2021-11-29 LAB — PHQ9 SCORE: 21

## 2021-11-30 RX ORDER — NABUMETONE 500 MG/1
500-1000 TABLET, FILM COATED ORAL 2 TIMES DAILY PRN
Qty: 90 TABLET | Refills: 1 | Status: ON HOLD | OUTPATIENT
Start: 2021-11-30 | End: 2021-12-23

## 2021-12-03 ENCOUNTER — PATIENT OUTREACH (OUTPATIENT)
Dept: FAMILY MEDICINE | Facility: CLINIC | Age: 41
End: 2021-12-03
Payer: COMMERCIAL

## 2021-12-03 DIAGNOSIS — B97.7 HIGH RISK HPV INFECTION: ICD-10-CM

## 2021-12-03 NOTE — LETTER
December 3, 2021      Alex Feldman  [address] [facility] [address]        Dear ,    This letter is to remind you that you are due for your follow-up Pap smear and Human Papillomavirus (HPV) test.    Please call [phone] to schedule your appointment at your earliest convenience.    If you have completed the appointment outside of the [facility] system, please have the records forwarded to our office. We will update your chart for your provider to review before your next annual wellness visit.     Thank you for choosing [facility]!      Sincerely,    Your [facility] Care Team

## 2021-12-06 DIAGNOSIS — Z11.59 ENCOUNTER FOR SCREENING FOR OTHER VIRAL DISEASES: ICD-10-CM

## 2021-12-14 ENCOUNTER — HOSPITAL ENCOUNTER (OUTPATIENT)
Dept: MRI IMAGING | Facility: HOSPITAL | Age: 41
Discharge: HOME | End: 2021-12-14
Attending: PSYCHIATRY & NEUROLOGY | Admitting: PSYCHIATRY & NEUROLOGY
Payer: COMMERCIAL

## 2021-12-14 DIAGNOSIS — S06.9X9D MILD TRAUMATIC BRAIN INJURY WITH LOSS OF CONSCIOUSNESS, SUBSEQUENT ENCOUNTER: ICD-10-CM

## 2021-12-14 PROCEDURE — 70551 MRI BRAIN STEM W/O DYE: CPT

## 2021-12-15 ENCOUNTER — OFFICE VISIT (OUTPATIENT)
Dept: NEUROLOGY | Facility: CLINIC | Age: 41
End: 2021-12-15
Attending: PSYCHIATRY & NEUROLOGY
Payer: COMMERCIAL

## 2021-12-15 VITALS
BODY MASS INDEX: 29.7 KG/M2 | HEART RATE: 91 BPM | DIASTOLIC BLOOD PRESSURE: 81 MMHG | HEIGHT: 68 IN | WEIGHT: 196 LBS | SYSTOLIC BLOOD PRESSURE: 116 MMHG

## 2021-12-15 DIAGNOSIS — S06.9X9D MILD TRAUMATIC BRAIN INJURY WITH LOSS OF CONSCIOUSNESS, SUBSEQUENT ENCOUNTER: ICD-10-CM

## 2021-12-15 DIAGNOSIS — G43.809 OTHER MIGRAINE WITHOUT STATUS MIGRAINOSUS, NOT INTRACTABLE: ICD-10-CM

## 2021-12-15 DIAGNOSIS — G47.00 INSOMNIA, UNSPECIFIED TYPE: Primary | ICD-10-CM

## 2021-12-15 DIAGNOSIS — Z11.59 ENCOUNTER FOR SCREENING FOR OTHER VIRAL DISEASES: ICD-10-CM

## 2021-12-15 PROCEDURE — 99215 OFFICE O/P EST HI 40 MIN: CPT | Performed by: PSYCHIATRY & NEUROLOGY

## 2021-12-15 RX ORDER — RIZATRIPTAN BENZOATE 10 MG/1
10 TABLET ORAL
Qty: 18 TABLET | Refills: 1 | Status: SHIPPED | OUTPATIENT
Start: 2021-12-15

## 2021-12-15 RX ORDER — DIVALPROEX SODIUM 500 MG/1
500 TABLET, DELAYED RELEASE ORAL 2 TIMES DAILY
Qty: 180 TABLET | Refills: 1 | Status: SHIPPED | OUTPATIENT
Start: 2021-12-15 | End: 2022-07-20

## 2021-12-15 RX ORDER — TIZANIDINE 2 MG/1
2 TABLET ORAL 3 TIMES DAILY
Qty: 90 TABLET | Refills: 1 | Status: SHIPPED | OUTPATIENT
Start: 2021-12-15 | End: 2022-01-27

## 2021-12-15 RX ORDER — GABAPENTIN 300 MG/1
300 CAPSULE ORAL AT BEDTIME
Qty: 90 CAPSULE | Refills: 1 | Status: SHIPPED | OUTPATIENT
Start: 2021-12-15

## 2021-12-15 RX ORDER — AMITRIPTYLINE HYDROCHLORIDE 75 MG/1
75 TABLET ORAL AT BEDTIME
Qty: 90 TABLET | Refills: 1 | Status: SHIPPED | OUTPATIENT
Start: 2021-12-15 | End: 2022-07-20

## 2021-12-15 RX ORDER — BUPROPION HYDROCHLORIDE 300 MG/1
300 TABLET ORAL DAILY
COMMUNITY
Start: 2021-11-20

## 2021-12-15 ASSESSMENT — MIFFLIN-ST. JEOR: SCORE: 1592.55
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
## 2021-12-15 NOTE — NURSING NOTE
Chief Complaint   Patient presents with     Follow Up     6 week      Carly Gates MA on 12/15/2021 at 9:08 AM

## 2021-12-16 ENCOUNTER — TRANSFERRED RECORDS (OUTPATIENT)
Dept: HEALTH INFORMATION MANAGEMENT | Facility: CLINIC | Age: 41
End: 2021-12-16

## 2021-12-16 LAB — PHQ9 SCORE: 19

## 2021-12-17 ENCOUNTER — NURSE TRIAGE (OUTPATIENT)
Dept: NURSING | Facility: CLINIC | Age: 41
End: 2021-12-17
Payer: COMMERCIAL

## 2021-12-17 NOTE — TELEPHONE ENCOUNTER
RN triage   Call from pt   Pt states she is having surgery 12/22  Pt has pre op PX scheduled 12/20 end of day   Pt states she has not been told what type of skin prep she needs to do and she does not have a pre op covid test scheduled yet   Pt states she was told that can all be done at pre op PX --  Pt wants to make sure she has enough time to schedule/get / and get results of covid test -- and enough time to do pre op prep    Please advise     Erlinda De La Cruz RN  [facility]  Triage Nurse Advisor    COVID 19 Nurse Triage Plan/Patient Instructions    Please be aware that novel coronavirus (COVID-19) may be circulating in the community. If you develop symptoms such as fever, cough, or SOB or if you have concerns about the presence of another infection including coronavirus (COVID-19), please contact your health care provider or visit [email or web address].     Disposition/Instructions    Home care recommended. Follow home care protocol based instructions.    Thank you for taking steps to prevent the spread of this virus.  o Limit your contact with others.  o Wear a simple mask to cover your cough.  o Wash your hands well and often.    Resources    [facility]: About COVID-19: [email or web address]    CDC: What to Do If You're Sick: www.cdc.gov/coronavirus/2019-ncov/about/steps-when-sick.html    CDC: Ending Home Isolation: www.cdc.gov/coronavirus/2019-ncov/hcp/disposition-in-home-patients.html     CDC: Caring for Someone: www.cdc.gov/coronavirus/2019-ncov/if-you-are-sick/care-for-someone.html     [facility]: Interim Guidance for Hospital Discharge to Home: www.health.[facility].[place]/diseases/coronavirus/hcp/hospdischarge.pdf    [facility] clinical trials (COVID-19 research studies): [email or web address].[facility]/umn-clinical-trials     Below are the COVID-19 hotlines at the [place] Department of Health ([facility]). Interpreters are available.   o For health questions: Call 604-785-4469 or 1-860.408.1914 (7 a.m.  to 7 p.m.)  o For questions about schools and childcare: Call 619-309-4050 or 1-455.742.1447 (7 a.m. to 7 p.m.)                     Reason for Disposition    [1] Caller requesting NON-URGENT health information AND [2] PCP's office is the best resource    Additional Information    Nursing judgment    Protocols used: INFORMATION ONLY CALL-A-AH, INFORMATION ONLY CALL - NO TRIAGE-A-OH

## 2021-12-17 NOTE — TELEPHONE ENCOUNTER
Spoke to pt and schedule covid test as there were already orders from her surgeon. I also told her that she can talk with the provider at her pre-op about any prep needed. If you have anything else to add let me know!

## 2021-12-17 NOTE — TELEPHONE ENCOUNTER
I don't think we usually give pre-op instructions for surgeries other than c-sections, but perhaps our handouts and body wash apply for that also?  I might have her ask her surgeon if there is special prep, but we do have the wash at clinic if that's what they want.

## 2021-12-19 ENCOUNTER — LAB (OUTPATIENT)
Dept: FAMILY MEDICINE | Facility: CLINIC | Age: 41
End: 2021-12-19
Payer: COMMERCIAL

## 2021-12-19 DIAGNOSIS — Z11.59 ENCOUNTER FOR SCREENING FOR OTHER VIRAL DISEASES: ICD-10-CM

## 2021-12-19 LAB — SARS-COV-2 RNA RESP QL NAA+PROBE: NEGATIVE

## 2021-12-19 PROCEDURE — U0005 INFEC AGEN DETEC AMPLI PROBE: HCPCS

## 2021-12-19 PROCEDURE — U0003 INFECTIOUS AGENT DETECTION BY NUCLEIC ACID (DNA OR RNA); SEVERE ACUTE RESPIRATORY SYNDROME CORONAVIRUS 2 (SARS-COV-2) (CORONAVIRUS DISEASE [COVID-19]), AMPLIFIED PROBE TECHNIQUE, MAKING USE OF HIGH THROUGHPUT TECHNOLOGIES AS DESCRIBED BY CMS-2020-01-R: HCPCS

## 2021-12-20 ENCOUNTER — OFFICE VISIT (OUTPATIENT)
Dept: INTERNAL MEDICINE | Facility: CLINIC | Age: 41
End: 2021-12-20
Attending: ORTHOPAEDIC SURGERY
Payer: COMMERCIAL

## 2021-12-20 VITALS
WEIGHT: 196 LBS | BODY MASS INDEX: 29.7 KG/M2 | DIASTOLIC BLOOD PRESSURE: 78 MMHG | SYSTOLIC BLOOD PRESSURE: 110 MMHG | HEART RATE: 92 BPM | HEIGHT: 68 IN

## 2021-12-20 DIAGNOSIS — S06.9X9D MILD TRAUMATIC BRAIN INJURY WITH LOSS OF CONSCIOUSNESS, SUBSEQUENT ENCOUNTER: ICD-10-CM

## 2021-12-20 DIAGNOSIS — M54.2 CERVICALGIA: ICD-10-CM

## 2021-12-20 DIAGNOSIS — F41.1 GAD (GENERALIZED ANXIETY DISORDER): ICD-10-CM

## 2021-12-20 DIAGNOSIS — Z01.818 PREOPERATIVE EXAMINATION: ICD-10-CM

## 2021-12-20 DIAGNOSIS — R25.1 TREMOR: ICD-10-CM

## 2021-12-20 DIAGNOSIS — Z11.59 ENCOUNTER FOR SCREENING FOR OTHER VIRAL DISEASES: ICD-10-CM

## 2021-12-20 LAB
BASOPHILS # BLD AUTO: 0 10E3/UL (ref 0–0.2)
BASOPHILS NFR BLD AUTO: 1 %
EOSINOPHIL # BLD AUTO: 0.3 10E3/UL (ref 0–0.7)
EOSINOPHIL NFR BLD AUTO: 5 %
ERYTHROCYTE [DISTWIDTH] IN BLOOD BY AUTOMATED COUNT: 14.8 % (ref 10–15)
HCT VFR BLD AUTO: 31.7 % (ref 35–47)
HGB BLD-MCNC: 9.9 G/DL (ref 11.7–15.7)
IMM GRANULOCYTES # BLD: 0 10E3/UL
IMM GRANULOCYTES NFR BLD: 0 %
LYMPHOCYTES # BLD AUTO: 1.7 10E3/UL (ref 0.8–5.3)
LYMPHOCYTES NFR BLD AUTO: 31 %
MCH RBC QN AUTO: 25.4 PG (ref 26.5–33)
MCHC RBC AUTO-ENTMCNC: 31.2 G/DL (ref 31.5–36.5)
MCV RBC AUTO: 82 FL (ref 78–100)
MONOCYTES # BLD AUTO: 0.3 10E3/UL (ref 0–1.3)
MONOCYTES NFR BLD AUTO: 5 %
NEUTROPHILS # BLD AUTO: 3.2 10E3/UL (ref 1.6–8.3)
NEUTROPHILS NFR BLD AUTO: 58 %
PLATELET # BLD AUTO: 251 10E3/UL (ref 150–450)
RBC # BLD AUTO: 3.89 10E6/UL (ref 3.8–5.2)
WBC # BLD AUTO: 5.5 10E3/UL (ref 4–11)

## 2021-12-20 PROCEDURE — 85025 COMPLETE CBC W/AUTO DIFF WBC: CPT | Performed by: NURSE PRACTITIONER

## 2021-12-20 PROCEDURE — 80048 BASIC METABOLIC PNL TOTAL CA: CPT | Performed by: NURSE PRACTITIONER

## 2021-12-20 PROCEDURE — 99214 OFFICE O/P EST MOD 30 MIN: CPT | Performed by: NURSE PRACTITIONER

## 2021-12-20 PROCEDURE — 36415 COLL VENOUS BLD VENIPUNCTURE: CPT | Performed by: NURSE PRACTITIONER

## 2021-12-20 ASSESSMENT — MIFFLIN-ST. JEOR: SCORE: 1592.55

## 2021-12-20 NOTE — PATIENT INSTRUCTIONS
Continue off of all NSAIDs until after surgery.    The rest of your medications are okay to take.    Your labs are processing this time, I will release results on [employer] once they are back.    Follow-up prior to surgery if having new symptoms.  Patient Education     Spinal Fusion: Cervical  Fusing vertebrae in the neck (the top 7 vertebrae of your spine) may help ease neck and arm pain. It may also help relieve progressive paralysis caused by compression of your nerve roots or spinal cord. Two or more vertebrae in your neck are fused. Fusion may be done through a cut in the front or the back of the neck. The surgery can take from 1 to 4 hours.      Cervical vertebrae    The fusion procedure  These steps apply to fusion from the front of the neck:    A head clamp or strap may be put on to align your neck    The skin is cut and the muscles, blood vessels, trachea, and esophagus are pushed to one side to get to the vertebrae and disks.    An X-ray is done to check that the right spinal level is being operated on.    The disk is removed from between the vertebrae to be fused.    Bone spurs are removed.    A bone graft or intervertebral cage filled with bone is placed into the now-empty space between the vertebrae. Screws, plates, or a plate with screws are often placed in to ensure additional stability. In time, the graft and the bone around it will grow into a solid unit.    To help keep your spine steady and promote fusion, extra support may be used    A drain can be left in the wound for a day or two.    The incision is closed with glue, stitches or staples.    A neck brace, rigid or soft, might be placed on your neck.     The disk between the vertebrae is removed.       Bone graft is placed into the now-empty space between the vertebrae.       Over a few months, the bone graft and vertebrae fuse into a solid unit.    [employer] last reviewed this educational content on 5/1/2018 2000-2020 [employer]  [address] [place] [id number]. All rights reserved. This information is not intended as a substitute for professional medical care. Always follow your healthcare professional's instructions.  This information has been modified by your health care provider with permission from the publisher.
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
## 2021-12-21 ENCOUNTER — ANESTHESIA EVENT (OUTPATIENT)
Dept: SURGERY | Facility: CLINIC | Age: 41
DRG: 473 | End: 2021-12-21
Payer: COMMERCIAL

## 2021-12-21 LAB
ANION GAP SERPL CALCULATED.3IONS-SCNC: 11 MMOL/L (ref 5–18)
BUN SERPL-MCNC: 9 MG/DL (ref 8–22)
CALCIUM SERPL-MCNC: 8.7 MG/DL (ref 8.5–10.5)
CHLORIDE BLD-SCNC: 105 MMOL/L (ref 98–107)
CO2 SERPL-SCNC: 23 MMOL/L (ref 22–31)
CREAT SERPL-MCNC: 1.03 MG/DL (ref 0.6–1.1)
GFR SERPL CREATININE-BSD FRML MDRD: 67 ML/MIN/1.73M2
GLUCOSE BLD-MCNC: 90 MG/DL (ref 70–125)
POTASSIUM BLD-SCNC: 4.3 MMOL/L (ref 3.5–5)
SODIUM SERPL-SCNC: 139 MMOL/L (ref 136–145)

## 2021-12-21 NOTE — OR NURSING
Pre op hgb 9.9, down from 10.6 in Aug 2021. A yellow sheet alerting Anesthesia was placed on the front of the chart and a message was left with this information, on Doctor Porter team's voicemail. Awaiting to hear back from provider for continued plan.     Kayleen from Dr. Johns's office called and stated that Dr. Buenrostro is aware of the pre  Op hgb and it is okay to proceed as scheduled.

## 2021-12-22 ENCOUNTER — HOSPITAL ENCOUNTER (INPATIENT)
Facility: CLINIC | Age: 41
LOS: 1 days | Discharge: HOME HEALTH | DRG: 473 | End: 2021-12-23
Attending: ORTHOPAEDIC SURGERY | Admitting: PSYCHIATRY & NEUROLOGY
Payer: COMMERCIAL

## 2021-12-22 ENCOUNTER — ANESTHESIA (OUTPATIENT)
Dept: SURGERY | Facility: CLINIC | Age: 41
DRG: 473 | End: 2021-12-22
Payer: COMMERCIAL

## 2021-12-22 ENCOUNTER — APPOINTMENT (OUTPATIENT)
Dept: PHYSICAL THERAPY | Facility: CLINIC | Age: 41
DRG: 473 | End: 2021-12-22
Attending: ORTHOPAEDIC SURGERY
Payer: COMMERCIAL

## 2021-12-22 ENCOUNTER — APPOINTMENT (OUTPATIENT)
Dept: RADIOLOGY | Facility: CLINIC | Age: 41
DRG: 473 | End: 2021-12-22
Attending: ORTHOPAEDIC SURGERY
Payer: COMMERCIAL

## 2021-12-22 DIAGNOSIS — Z98.1 S/P CERVICAL SPINAL FUSION: Primary | ICD-10-CM

## 2021-12-22 PROBLEM — R29.818 NEUROGENIC CLAUDICATION: Status: ACTIVE | Noted: 2021-12-22

## 2021-12-22 PROCEDURE — 278N000051 HC OR IMPLANT GENERAL: Performed by: ORTHOPAEDIC SURGERY

## 2021-12-22 PROCEDURE — C1713 ANCHOR/SCREW BN/BN,TIS/BN: HCPCS | Performed by: ORTHOPAEDIC SURGERY

## 2021-12-22 PROCEDURE — 258N000003 HC RX IP 258 OP 636: Performed by: ANESTHESIOLOGY

## 2021-12-22 PROCEDURE — 250N000013 HC RX MED GY IP 250 OP 250 PS 637: Performed by: ANESTHESIOLOGY

## 2021-12-22 PROCEDURE — 360N000084 HC SURGERY LEVEL 4 W/ FLUORO, PER MIN: Performed by: ORTHOPAEDIC SURGERY

## 2021-12-22 PROCEDURE — 01N10ZZ RELEASE CERVICAL NERVE, OPEN APPROACH: ICD-10-PCS | Performed by: ORTHOPAEDIC SURGERY

## 2021-12-22 PROCEDURE — 250N000009 HC RX 250: Performed by: NURSE ANESTHETIST, CERTIFIED REGISTERED

## 2021-12-22 PROCEDURE — 250N000011 HC RX IP 250 OP 636: Performed by: NURSE ANESTHETIST, CERTIFIED REGISTERED

## 2021-12-22 PROCEDURE — 97530 THERAPEUTIC ACTIVITIES: CPT | Mod: GP

## 2021-12-22 PROCEDURE — 97162 PT EVAL MOD COMPLEX 30 MIN: CPT | Mod: GP

## 2021-12-22 PROCEDURE — 250N000005 HC OR RX SURGIFLO HEMOSTATIC MATRIX 10ML 199102S OPNP: Performed by: ORTHOPAEDIC SURGERY

## 2021-12-22 PROCEDURE — 250N000011 HC RX IP 250 OP 636: Performed by: PHYSICIAN ASSISTANT

## 2021-12-22 PROCEDURE — 272N000001 HC OR GENERAL SUPPLY STERILE: Performed by: ORTHOPAEDIC SURGERY

## 2021-12-22 PROCEDURE — 710N000009 HC RECOVERY PHASE 1, LEVEL 1, PER MIN: Performed by: ORTHOPAEDIC SURGERY

## 2021-12-22 PROCEDURE — 999N000182 XR SURGERY CARM FLUORO GREATER THAN 5 MIN

## 2021-12-22 PROCEDURE — 99254 IP/OBS CNSLTJ NEW/EST MOD 60: CPT | Performed by: INTERNAL MEDICINE

## 2021-12-22 PROCEDURE — 999N000141 HC STATISTIC PRE-PROCEDURE NURSING ASSESSMENT: Performed by: ORTHOPAEDIC SURGERY

## 2021-12-22 PROCEDURE — 370N000017 HC ANESTHESIA TECHNICAL FEE, PER MIN: Performed by: ORTHOPAEDIC SURGERY

## 2021-12-22 PROCEDURE — 250N000011 HC RX IP 250 OP 636: Performed by: ANESTHESIOLOGY

## 2021-12-22 PROCEDURE — 250N000013 HC RX MED GY IP 250 OP 250 PS 637: Performed by: PHYSICIAN ASSISTANT

## 2021-12-22 PROCEDURE — 0RT30ZZ RESECTION OF CERVICAL VERTEBRAL DISC, OPEN APPROACH: ICD-10-PCS | Performed by: ORTHOPAEDIC SURGERY

## 2021-12-22 PROCEDURE — 120N000001 HC R&B MED SURG/OB

## 2021-12-22 PROCEDURE — C1762 CONN TISS, HUMAN(INC FASCIA): HCPCS | Performed by: ORTHOPAEDIC SURGERY

## 2021-12-22 PROCEDURE — 97116 GAIT TRAINING THERAPY: CPT | Mod: GP

## 2021-12-22 PROCEDURE — 250N000009 HC RX 250: Performed by: ORTHOPAEDIC SURGERY

## 2021-12-22 PROCEDURE — 0RG10A0 FUSION OF CERVICAL VERTEBRAL JOINT WITH INTERBODY FUSION DEVICE, ANTERIOR APPROACH, ANTERIOR COLUMN, OPEN APPROACH: ICD-10-PCS | Performed by: ORTHOPAEDIC SURGERY

## 2021-12-22 DEVICE — ROI-C LORDOTIC COATED IMPLANT 14X17 H8
Type: IMPLANTABLE DEVICE | Site: NECK | Status: FUNCTIONAL
Brand: ROI-C

## 2021-12-22 DEVICE — GRAFT ALLOGRAFT ARTHREX ALLOSYNC DBM PASTE 1ML ABS-2015-01: Type: IMPLANTABLE DEVICE | Site: NECK | Status: FUNCTIONAL

## 2021-12-22 DEVICE — ROI-C LONG ANCHORING PLATE
Type: IMPLANTABLE DEVICE | Site: NECK | Status: FUNCTIONAL
Brand: ROI-C

## 2021-12-22 RX ORDER — ACETAMINOPHEN 325 MG/1
975 TABLET ORAL EVERY 8 HOURS
Status: DISCONTINUED | OUTPATIENT
Start: 2021-12-22 | End: 2021-12-23 | Stop reason: HOSPADM

## 2021-12-22 RX ORDER — DEXMEDETOMIDINE HYDROCHLORIDE 4 UG/ML
INJECTION, SOLUTION INTRAVENOUS
Status: DISCONTINUED
Start: 2021-12-22 | End: 2021-12-22 | Stop reason: HOSPADM

## 2021-12-22 RX ORDER — GABAPENTIN 300 MG/1
300 CAPSULE ORAL
Status: COMPLETED | OUTPATIENT
Start: 2021-12-22 | End: 2021-12-22

## 2021-12-22 RX ORDER — ONDANSETRON 2 MG/ML
4 INJECTION INTRAMUSCULAR; INTRAVENOUS EVERY 30 MIN PRN
Status: DISCONTINUED | OUTPATIENT
Start: 2021-12-22 | End: 2021-12-22 | Stop reason: HOSPADM

## 2021-12-22 RX ORDER — NEOSTIGMINE METHYLSULFATE 1 MG/ML
VIAL (ML) INJECTION PRN
Status: DISCONTINUED | OUTPATIENT
Start: 2021-12-22 | End: 2021-12-22

## 2021-12-22 RX ORDER — SODIUM CHLORIDE 9 MG/ML
INJECTION, SOLUTION INTRAVENOUS CONTINUOUS
Status: DISCONTINUED | OUTPATIENT
Start: 2021-12-22 | End: 2021-12-23 | Stop reason: HOSPADM

## 2021-12-22 RX ORDER — ACETAMINOPHEN 325 MG/1
650 TABLET ORAL EVERY 4 HOURS PRN
Status: DISCONTINUED | OUTPATIENT
Start: 2021-12-25 | End: 2021-12-23 | Stop reason: HOSPADM

## 2021-12-22 RX ORDER — ONDANSETRON 4 MG/1
4 TABLET, ORALLY DISINTEGRATING ORAL EVERY 30 MIN PRN
Status: DISCONTINUED | OUTPATIENT
Start: 2021-12-22 | End: 2021-12-22 | Stop reason: HOSPADM

## 2021-12-22 RX ORDER — CEFAZOLIN SODIUM 2 G/100ML
2 INJECTION, SOLUTION INTRAVENOUS
Status: COMPLETED | OUTPATIENT
Start: 2021-12-22 | End: 2021-12-22

## 2021-12-22 RX ORDER — FENTANYL CITRATE 50 UG/ML
INJECTION, SOLUTION INTRAMUSCULAR; INTRAVENOUS PRN
Status: DISCONTINUED | OUTPATIENT
Start: 2021-12-22 | End: 2021-12-22

## 2021-12-22 RX ORDER — HYDROMORPHONE HCL IN WATER/PF 6 MG/30 ML
0.2 PATIENT CONTROLLED ANALGESIA SYRINGE INTRAVENOUS EVERY 5 MIN PRN
Status: DISCONTINUED | OUTPATIENT
Start: 2021-12-22 | End: 2021-12-22 | Stop reason: HOSPADM

## 2021-12-22 RX ORDER — HYDROXYZINE HYDROCHLORIDE 25 MG/1
25 TABLET, FILM COATED ORAL EVERY 6 HOURS PRN
Status: DISCONTINUED | OUTPATIENT
Start: 2021-12-22 | End: 2021-12-23 | Stop reason: HOSPADM

## 2021-12-22 RX ORDER — CEFAZOLIN SODIUM 2 G/100ML
2 INJECTION, SOLUTION INTRAVENOUS SEE ADMIN INSTRUCTIONS
Status: DISCONTINUED | OUTPATIENT
Start: 2021-12-22 | End: 2021-12-22 | Stop reason: HOSPADM

## 2021-12-22 RX ORDER — KETAMINE HYDROCHLORIDE 10 MG/ML
INJECTION INTRAMUSCULAR; INTRAVENOUS PRN
Status: DISCONTINUED | OUTPATIENT
Start: 2021-12-22 | End: 2021-12-22

## 2021-12-22 RX ORDER — NALOXONE HYDROCHLORIDE 0.4 MG/ML
0.2 INJECTION, SOLUTION INTRAMUSCULAR; INTRAVENOUS; SUBCUTANEOUS
Status: DISCONTINUED | OUTPATIENT
Start: 2021-12-22 | End: 2021-12-23 | Stop reason: HOSPADM

## 2021-12-22 RX ORDER — METHOCARBAMOL 750 MG/1
750 TABLET, FILM COATED ORAL EVERY 6 HOURS PRN
Status: DISCONTINUED | OUTPATIENT
Start: 2021-12-22 | End: 2021-12-23 | Stop reason: HOSPADM

## 2021-12-22 RX ORDER — MAGNESIUM SULFATE 4 G/50ML
4 INJECTION INTRAVENOUS ONCE
Status: COMPLETED | OUTPATIENT
Start: 2021-12-22 | End: 2021-12-22

## 2021-12-22 RX ORDER — MULTIVITAMIN,THERAPEUTIC
1 TABLET ORAL DAILY
Status: DISCONTINUED | OUTPATIENT
Start: 2021-12-23 | End: 2021-12-23 | Stop reason: HOSPADM

## 2021-12-22 RX ORDER — CEFAZOLIN SODIUM 2 G/100ML
2 INJECTION, SOLUTION INTRAVENOUS EVERY 8 HOURS
Status: COMPLETED | OUTPATIENT
Start: 2021-12-22 | End: 2021-12-23

## 2021-12-22 RX ORDER — POLYETHYLENE GLYCOL 3350 17 G/17G
17 POWDER, FOR SOLUTION ORAL DAILY
Status: DISCONTINUED | OUTPATIENT
Start: 2021-12-23 | End: 2021-12-23 | Stop reason: HOSPADM

## 2021-12-22 RX ORDER — LIDOCAINE 40 MG/G
CREAM TOPICAL
Status: DISCONTINUED | OUTPATIENT
Start: 2021-12-22 | End: 2021-12-22 | Stop reason: HOSPADM

## 2021-12-22 RX ORDER — BISACODYL 10 MG
10 SUPPOSITORY, RECTAL RECTAL DAILY PRN
Status: DISCONTINUED | OUTPATIENT
Start: 2021-12-22 | End: 2021-12-23 | Stop reason: HOSPADM

## 2021-12-22 RX ORDER — LIDOCAINE HYDROCHLORIDE 10 MG/ML
INJECTION, SOLUTION INFILTRATION; PERINEURAL PRN
Status: DISCONTINUED | OUTPATIENT
Start: 2021-12-22 | End: 2021-12-22

## 2021-12-22 RX ORDER — GLYCOPYRROLATE 0.2 MG/ML
INJECTION, SOLUTION INTRAMUSCULAR; INTRAVENOUS PRN
Status: DISCONTINUED | OUTPATIENT
Start: 2021-12-22 | End: 2021-12-22

## 2021-12-22 RX ORDER — DEXAMETHASONE SODIUM PHOSPHATE 10 MG/ML
INJECTION, SOLUTION INTRAMUSCULAR; INTRAVENOUS PRN
Status: DISCONTINUED | OUTPATIENT
Start: 2021-12-22 | End: 2021-12-22

## 2021-12-22 RX ORDER — PROPOFOL 10 MG/ML
INJECTION, EMULSION INTRAVENOUS PRN
Status: DISCONTINUED | OUTPATIENT
Start: 2021-12-22 | End: 2021-12-22

## 2021-12-22 RX ORDER — PROPOFOL 10 MG/ML
INJECTION, EMULSION INTRAVENOUS CONTINUOUS PRN
Status: DISCONTINUED | OUTPATIENT
Start: 2021-12-22 | End: 2021-12-22

## 2021-12-22 RX ORDER — HYDROXYZINE HYDROCHLORIDE 50 MG/ML
25 INJECTION, SOLUTION INTRAMUSCULAR EVERY 4 HOURS PRN
Status: DISCONTINUED | OUTPATIENT
Start: 2021-12-22 | End: 2021-12-23 | Stop reason: HOSPADM

## 2021-12-22 RX ORDER — HYDROMORPHONE HYDROCHLORIDE 1 MG/ML
0.5 INJECTION, SOLUTION INTRAMUSCULAR; INTRAVENOUS; SUBCUTANEOUS
Status: DISCONTINUED | OUTPATIENT
Start: 2021-12-22 | End: 2021-12-23 | Stop reason: HOSPADM

## 2021-12-22 RX ORDER — SODIUM CHLORIDE, SODIUM LACTATE, POTASSIUM CHLORIDE, CALCIUM CHLORIDE 600; 310; 30; 20 MG/100ML; MG/100ML; MG/100ML; MG/100ML
INJECTION, SOLUTION INTRAVENOUS CONTINUOUS
Status: DISCONTINUED | OUTPATIENT
Start: 2021-12-22 | End: 2021-12-22 | Stop reason: HOSPADM

## 2021-12-22 RX ORDER — NALOXONE HYDROCHLORIDE 0.4 MG/ML
0.4 INJECTION, SOLUTION INTRAMUSCULAR; INTRAVENOUS; SUBCUTANEOUS
Status: DISCONTINUED | OUTPATIENT
Start: 2021-12-22 | End: 2021-12-23 | Stop reason: HOSPADM

## 2021-12-22 RX ORDER — DIVALPROEX SODIUM 250 MG/1
500 TABLET, DELAYED RELEASE ORAL 2 TIMES DAILY
Status: CANCELLED | OUTPATIENT
Start: 2021-12-22

## 2021-12-22 RX ORDER — BUPROPION HYDROCHLORIDE 150 MG/1
300 TABLET ORAL DAILY
Status: CANCELLED | OUTPATIENT
Start: 2021-12-22

## 2021-12-22 RX ORDER — OXYCODONE HYDROCHLORIDE 5 MG/1
5 TABLET ORAL EVERY 4 HOURS PRN
Status: DISCONTINUED | OUTPATIENT
Start: 2021-12-22 | End: 2021-12-22 | Stop reason: HOSPADM

## 2021-12-22 RX ORDER — GABAPENTIN 300 MG/1
300 CAPSULE ORAL AT BEDTIME
Status: CANCELLED | OUTPATIENT
Start: 2021-12-22

## 2021-12-22 RX ORDER — ONDANSETRON 2 MG/ML
INJECTION INTRAMUSCULAR; INTRAVENOUS PRN
Status: DISCONTINUED | OUTPATIENT
Start: 2021-12-22 | End: 2021-12-22

## 2021-12-22 RX ORDER — AMOXICILLIN 250 MG
1 CAPSULE ORAL 2 TIMES DAILY
Status: DISCONTINUED | OUTPATIENT
Start: 2021-12-22 | End: 2021-12-23 | Stop reason: HOSPADM

## 2021-12-22 RX ORDER — SERTRALINE HYDROCHLORIDE 100 MG/1
200 TABLET, FILM COATED ORAL DAILY
Status: CANCELLED | OUTPATIENT
Start: 2021-12-22

## 2021-12-22 RX ORDER — HYDROXYZINE HYDROCHLORIDE 25 MG/1
25 TABLET, FILM COATED ORAL EVERY 4 HOURS PRN
Status: DISCONTINUED | OUTPATIENT
Start: 2021-12-22 | End: 2021-12-23 | Stop reason: HOSPADM

## 2021-12-22 RX ORDER — CLONAZEPAM 0.5 MG/1
1 TABLET ORAL 2 TIMES DAILY PRN
Status: CANCELLED | OUTPATIENT
Start: 2021-12-22

## 2021-12-22 RX ORDER — RIZATRIPTAN BENZOATE 10 MG/1
10 TABLET ORAL
Status: CANCELLED | OUTPATIENT
Start: 2021-12-22

## 2021-12-22 RX ORDER — DEXMEDETOMIDINE HYDROCHLORIDE 4 UG/ML
INJECTION, SOLUTION INTRAVENOUS CONTINUOUS PRN
Status: DISCONTINUED | OUTPATIENT
Start: 2021-12-22 | End: 2021-12-22

## 2021-12-22 RX ORDER — ONDANSETRON 4 MG/1
4 TABLET, ORALLY DISINTEGRATING ORAL EVERY 6 HOURS PRN
Status: DISCONTINUED | OUTPATIENT
Start: 2021-12-22 | End: 2021-12-23 | Stop reason: HOSPADM

## 2021-12-22 RX ORDER — PROCHLORPERAZINE MALEATE 10 MG
10 TABLET ORAL EVERY 6 HOURS PRN
Status: DISCONTINUED | OUTPATIENT
Start: 2021-12-22 | End: 2021-12-23 | Stop reason: HOSPADM

## 2021-12-22 RX ORDER — MEPERIDINE HYDROCHLORIDE 25 MG/ML
12.5 INJECTION INTRAMUSCULAR; INTRAVENOUS; SUBCUTANEOUS
Status: DISCONTINUED | OUTPATIENT
Start: 2021-12-22 | End: 2021-12-22 | Stop reason: HOSPADM

## 2021-12-22 RX ORDER — FENTANYL CITRATE 50 UG/ML
25 INJECTION, SOLUTION INTRAMUSCULAR; INTRAVENOUS
Status: DISCONTINUED | OUTPATIENT
Start: 2021-12-22 | End: 2021-12-22 | Stop reason: HOSPADM

## 2021-12-22 RX ORDER — ONDANSETRON 2 MG/ML
4 INJECTION INTRAMUSCULAR; INTRAVENOUS EVERY 6 HOURS PRN
Status: DISCONTINUED | OUTPATIENT
Start: 2021-12-22 | End: 2021-12-23 | Stop reason: HOSPADM

## 2021-12-22 RX ORDER — GABAPENTIN 100 MG/1
100 CAPSULE ORAL 3 TIMES DAILY
Status: DISCONTINUED | OUTPATIENT
Start: 2021-12-22 | End: 2021-12-23 | Stop reason: HOSPADM

## 2021-12-22 RX ORDER — OXYCODONE HYDROCHLORIDE 5 MG/1
10 TABLET ORAL EVERY 4 HOURS PRN
Status: DISCONTINUED | OUTPATIENT
Start: 2021-12-22 | End: 2021-12-23 | Stop reason: HOSPADM

## 2021-12-22 RX ORDER — FENTANYL CITRATE 50 UG/ML
25 INJECTION, SOLUTION INTRAMUSCULAR; INTRAVENOUS EVERY 5 MIN PRN
Status: DISCONTINUED | OUTPATIENT
Start: 2021-12-22 | End: 2021-12-22 | Stop reason: HOSPADM

## 2021-12-22 RX ORDER — LORATADINE 10 MG/1
10 TABLET ORAL DAILY PRN
Status: DISCONTINUED | OUTPATIENT
Start: 2021-12-22 | End: 2021-12-23 | Stop reason: HOSPADM

## 2021-12-22 RX ORDER — OXYCODONE HYDROCHLORIDE 15 MG/1
15 TABLET ORAL EVERY 4 HOURS PRN
Status: DISCONTINUED | OUTPATIENT
Start: 2021-12-22 | End: 2021-12-23 | Stop reason: HOSPADM

## 2021-12-22 RX ADMIN — FENTANYL CITRATE 100 MCG: 50 INJECTION, SOLUTION INTRAMUSCULAR; INTRAVENOUS at 07:32

## 2021-12-22 RX ADMIN — HYDROMORPHONE HYDROCHLORIDE 0.2 MG: 0.2 INJECTION, SOLUTION INTRAMUSCULAR; INTRAVENOUS; SUBCUTANEOUS at 09:26

## 2021-12-22 RX ADMIN — FENTANYL CITRATE 25 MCG: 50 INJECTION INTRAMUSCULAR; INTRAVENOUS at 09:08

## 2021-12-22 RX ADMIN — MIDAZOLAM HYDROCHLORIDE 0.5 MG: 1 INJECTION, SOLUTION INTRAMUSCULAR; INTRAVENOUS at 09:43

## 2021-12-22 RX ADMIN — OXYCODONE HYDROCHLORIDE 15 MG: 15 TABLET ORAL at 10:41

## 2021-12-22 RX ADMIN — HYDROMORPHONE HYDROCHLORIDE 0.2 MG: 0.2 INJECTION, SOLUTION INTRAMUSCULAR; INTRAVENOUS; SUBCUTANEOUS at 09:20

## 2021-12-22 RX ADMIN — ACETAMINOPHEN 975 MG: 325 TABLET ORAL at 20:36

## 2021-12-22 RX ADMIN — OXYCODONE HYDROCHLORIDE 10 MG: 5 TABLET ORAL at 17:33

## 2021-12-22 RX ADMIN — KETAMINE HYDROCHLORIDE 50 MG: 10 INJECTION, SOLUTION INTRAMUSCULAR; INTRAVENOUS at 07:54

## 2021-12-22 RX ADMIN — PROPOFOL 200 MG: 10 INJECTION, EMULSION INTRAVENOUS at 07:32

## 2021-12-22 RX ADMIN — ONDANSETRON 4 MG: 2 INJECTION INTRAMUSCULAR; INTRAVENOUS at 08:25

## 2021-12-22 RX ADMIN — GABAPENTIN 300 MG: 300 CAPSULE ORAL at 06:52

## 2021-12-22 RX ADMIN — OXYCODONE HYDROCHLORIDE 5 MG: 5 TABLET ORAL at 06:51

## 2021-12-22 RX ADMIN — PROPOFOL 150 MCG/KG/MIN: 10 INJECTION, EMULSION INTRAVENOUS at 07:34

## 2021-12-22 RX ADMIN — GABAPENTIN 100 MG: 100 CAPSULE ORAL at 20:37

## 2021-12-22 RX ADMIN — MAGNESIUM SULFATE HEPTAHYDRATE 4 G: 80 INJECTION, SOLUTION INTRAVENOUS at 07:15

## 2021-12-22 RX ADMIN — MIDAZOLAM 2 MG: 1 INJECTION INTRAMUSCULAR; INTRAVENOUS at 07:28

## 2021-12-22 RX ADMIN — HYDROMORPHONE HYDROCHLORIDE 0.2 MG: 0.2 INJECTION, SOLUTION INTRAMUSCULAR; INTRAVENOUS; SUBCUTANEOUS at 09:13

## 2021-12-22 RX ADMIN — SODIUM CHLORIDE, POTASSIUM CHLORIDE, SODIUM LACTATE AND CALCIUM CHLORIDE: 600; 310; 30; 20 INJECTION, SOLUTION INTRAVENOUS at 07:14

## 2021-12-22 RX ADMIN — LIDOCAINE HYDROCHLORIDE 25 MG: 10 INJECTION, SOLUTION INFILTRATION; PERINEURAL at 07:32

## 2021-12-22 RX ADMIN — CEFAZOLIN SODIUM 2 G: 2 INJECTION, SOLUTION INTRAVENOUS at 23:50

## 2021-12-22 RX ADMIN — Medication 0.5 MCG/KG/HR: at 07:34

## 2021-12-22 RX ADMIN — NEOSTIGMINE METHYLSULFATE 4 MG: 1 INJECTION INTRAVENOUS at 08:30

## 2021-12-22 RX ADMIN — CEFAZOLIN SODIUM 2 G: 2 INJECTION, SOLUTION INTRAVENOUS at 07:29

## 2021-12-22 RX ADMIN — FENTANYL CITRATE 25 MCG: 50 INJECTION INTRAMUSCULAR; INTRAVENOUS at 09:03

## 2021-12-22 RX ADMIN — OXYCODONE HYDROCHLORIDE 10 MG: 5 TABLET ORAL at 21:58

## 2021-12-22 RX ADMIN — HYDROMORPHONE HYDROCHLORIDE 1 MG: 1 INJECTION, SOLUTION INTRAMUSCULAR; INTRAVENOUS; SUBCUTANEOUS at 08:55

## 2021-12-22 RX ADMIN — GLYCOPYRROLATE 0.6 MG: 0.2 INJECTION, SOLUTION INTRAMUSCULAR; INTRAVENOUS at 08:30

## 2021-12-22 RX ADMIN — DEXAMETHASONE SODIUM PHOSPHATE 10 MG: 10 INJECTION, SOLUTION INTRAMUSCULAR; INTRAVENOUS at 07:32

## 2021-12-22 RX ADMIN — SENNOSIDES AND DOCUSATE SODIUM 1 TABLET: 50; 8.6 TABLET ORAL at 20:37

## 2021-12-22 RX ADMIN — HYDROMORPHONE HYDROCHLORIDE 0.2 MG: 0.2 INJECTION, SOLUTION INTRAMUSCULAR; INTRAVENOUS; SUBCUTANEOUS at 09:33

## 2021-12-22 RX ADMIN — MIDAZOLAM HYDROCHLORIDE 1 MG: 1 INJECTION, SOLUTION INTRAMUSCULAR; INTRAVENOUS at 07:15

## 2021-12-22 RX ADMIN — ONDANSETRON 4 MG: 2 INJECTION INTRAMUSCULAR; INTRAVENOUS at 13:33

## 2021-12-22 RX ADMIN — ROCURONIUM BROMIDE 50 MG: 10 INJECTION, SOLUTION INTRAVENOUS at 07:32

## 2021-12-22 RX ADMIN — HYDROMORPHONE HYDROCHLORIDE 0.2 MG: 0.2 INJECTION, SOLUTION INTRAMUSCULAR; INTRAVENOUS; SUBCUTANEOUS at 09:39

## 2021-12-22 RX ADMIN — CEFAZOLIN SODIUM 2 G: 2 INJECTION, SOLUTION INTRAVENOUS at 15:47

## 2021-12-22 ASSESSMENT — ACTIVITIES OF DAILY LIVING (ADL)
ADLS_ACUITY_SCORE: 10
ADLS_ACUITY_SCORE: 8
ADLS_ACUITY_SCORE: 12
ADLS_ACUITY_SCORE: 10
ADLS_ACUITY_SCORE: 10
ADLS_ACUITY_SCORE: 8
ADLS_ACUITY_SCORE: 12
ADLS_ACUITY_SCORE: 8
ADLS_ACUITY_SCORE: 12
ADLS_ACUITY_SCORE: 8
ADLS_ACUITY_SCORE: 12
ADLS_ACUITY_SCORE: 10
ADLS_ACUITY_SCORE: 8

## 2021-12-22 ASSESSMENT — MIFFLIN-ST. JEOR: SCORE: 1578.04

## 2021-12-22 NOTE — ANESTHESIA POSTPROCEDURE EVALUATION
Patient: Alex Feldman    Procedure: Procedure(s):  BILATERAL CERVICAL 5-6 ANTERIOR CERVICAL DECOMPRESSION FUSION       Diagnosis:Cervical radicular pain [M54.12]  Herniation of intervertebral disc at C5-C6 level [M50.222]  Neck pain [M54.2]  Diagnosis Additional Information: No value filed.    Anesthesia Type:  General    Note:  Disposition: Outpatient   Postop Pain Control: Challenging            Challenges/Interventions: Exacerbation of chronic pain; Acute Pain            Sign Out: Pain at Preoperative BASELINE   PONV: No   Neuro/Psych: Uneventful            Sign Out: Acceptable/Baseline neuro status   Airway/Respiratory: Uneventful            Sign Out: Acceptable/Baseline resp. status   CV/Hemodynamics: Uneventful            Sign Out: Acceptable CV status; No obvious hypovolemia; No obvious fluid overload   Other NRE: NONE   DID A NON-ROUTINE EVENT OCCUR? No           Last vitals:  Vitals Value Taken Time   BP 98/51 12/22/21 0930   Temp 36.1  C (97  F) 12/22/21 0850   Pulse 84 12/22/21 0939   Resp 17 12/22/21 0939   SpO2 98 % 12/22/21 0939   Vitals shown include unvalidated device data.    Electronically Signed By: Chavez Stafford MD  December 22, 2021  9:39 AM

## 2021-12-22 NOTE — OP NOTE
DATE OF SERVICE: 12-22-21    PREOPERATIVE DIAGNOSES:  1.  Degenerative disc disease, junctional kyphosis central stenosis and bilateral foraminal stenosis C5-6   2.   Failure of conservative measures.    POSTOPERATIVE DIAGNOSES:  Same      PROCEDURE:  1.  Left-sided Collins Barnes anterior approach to cervical spine.  2.  Complete block diskectomy at C5-6 with bilateral uncovertebral resections and decompression all the way back to the level of the spinal cord  3.  Placement of anterior cervical cage C5-6 with 1 cc of DBX Bautista PIETRO C titanium coated 8 mm height 14 mm depth 17 mm width 7 degree lordosis  4.  Anterior cervical plating C5-6 with 2 medium plates    SURGEON:  Dr. Efrain Buenrostro.    ASSISTANT:  Juan Manuel Saldana PA-C, who was needed for patient positioning, soft tissue  retraction, patient safety and assistance with closure of the wound.    ESTIMATED BLOOD LOSS:  20cc    DRAINS:  None.    COMPLICATIONS:  None perceived.    SPECIMENS SENT:  None.    HISTORY OF PRESENT ILLNESS AND INDICATIONS FOR PROCEDURE:  This is a 41 year old year-old patient who came to see me with significant pain in the neck and bilateral arms.  A sizable central disc herniation was appreciated with central stenosis and bilateral foraminal stenosis at C5-6.  We discussed options of continued nonoperative management, epidural steroid  injections, physical therapy or surgical intervention.  She had exhausted all  nonoperative measures and opted for surgery.  We had a significant discussion that surgery would not be done for neck pain, only the arm pain.  We also discussed the risks of the surgery including bleeding, infection, nerve damage, failure of the procedure to relieve symptoms, carotid injury, esophageal injury requiring repair, DVT, PE, blindness and even death.    DESCRIPTION OF  Therefore, the patient was seen in the preop area of [facility] today.  The neck was marked and the consent was again  reverified.  She   was  brought to the operating room, intubated via general  endotracheal anesthetic and placed on a flat top table with care taken to pad all bony prominences.  She was prepped and draped in a standard fashion for a  cervical spine procedure.  Pause for the Cause was performed correctly identifying the  patient, procedure, proposed plan and radiographs and all were in agreement.    We then localized our incision with lateral fluoroscopy so as not to cause any iatrogenic tissue damage and dissected down over the anterior aspect of C5 and C6.   This was verified on lateral fluoroscopy.  We then placed our self-retaining  retractors and performed a complete block diskectomy at C5-6.  We removed the  bilateral uncovertebral osteophytes and decompressed all the way back to the spinal cord.  There was no compression of either C6 nerve root nor the spinal cord were finished.   We therefore slightly  decorticated the endplates of the bodies.  We placed our cage so as to gain lordosis along with 1 cc of DBX.  We then placed the 2 medium plates which were in good position.  The instrumentation had good position on PA and lateral imaging and all the screws had good purchase.    We then irrigatedthe wound, took a PA and lateral x-ray to make certain that we liked the position and we did.  We then closed the platysma with #1 Vicryl, subcutaneous tissue with 2-0 Vicryl, skin with 3-0 Vicryl.  Steri-Strips and sterile dressing were applied.   Patient tolerated procedure well.  There were no apparent complications.  Restrictions are weightbearing as tolerated bilateral lower extremities with strict instructions to  avoid lifting more than a full gallon of milk over the next 6 weeks.  She   will have  early mobilization and ANATOLIY stockings for DVT prophylaxis and 2 grams of Ancef IV  q.8 hours x2 doses for antibiotics.  Return to see me in 6 weeks, sooner if there  are any problems, questions or concerns.
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
## 2021-12-22 NOTE — ANESTHESIA CARE TRANSFER NOTE
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    Anesthesia Type:   General     Note:    Oropharynx: oropharynx clear of all foreign objects  Level of Consciousness: awake  Oxygen Supplementation: nasal cannula  Level of Supplemental Oxygen (L/min / FiO2): 3  Independent Airway: airway patency satisfactory and stable  Dentition: dentition unchanged  Vital Signs Stable: post-procedure vital signs reviewed and stable  Report to RN Given: handoff report given  Patient transferred to: PACU    Handoff Report: Identifed the Patient, Identified the Reponsible Provider, Reviewed the pertinent medical history, Discussed the surgical course, Reviewed Intra-OP anesthesia mangement and issues during anesthesia, Set expectations for post-procedure period and Allowed opportunity for questions and acknowledgement of understanding      Vitals:  Vitals Value Taken Time   /61 12/22/21 0850   Temp 36.1  C (97  F) 12/22/21 0850   Pulse 86 12/22/21 0859   Resp 17 12/22/21 0859   SpO2 97 % 12/22/21 0859   Vitals shown include unvalidated device data.    Electronically Signed By: SONALI Mccall CRNA  December 22, 2021  9:00 AM

## 2021-12-22 NOTE — PROGRESS NOTES
12/22/21 1500   Quick Adds   Type of Visit Initial PT Evaluation   Living Environment   People in home spouse   Current Living Arrangements house   Home Accessibility stairs to enter home;stairs within home   Number of Stairs, Main Entrance 2  (platform steps)   Stair Railings, Main Entrance none   Number of Stairs, Within Home, Primary 10   Stair Railings, Within Home, Primary railing on left side (ascending)   Self-Care   Equipment Currently Used at Home none   Activity/Exercise/Self-Care Comment FWW and Cane at home   General Information   Onset of Illness/Injury or Date of Surgery 12/22/21   Referring Physician Dr. Jae Buenrostro   Patient/Family Therapy Goals Statement (PT) be able to move safely   Pertinent History of Current Problem (include personal factors and/or comorbidities that impact the POC) Hx of TBI, blurry vision, R LE weakness   Existing Precautions/Restrictions spinal  (cervical collar for comfort)   Weight-Bearing Status - LLE full weight-bearing   Weight-Bearing Status - RLE full weight-bearing   Cognition   Affect/Mental Status (Cognition) low arousal/lethargic   Bed Mobility   Bed Mobility supine-sit;sit-supine   Supine-Sit [place] (Bed Mobility) contact guard;verbal cues   Sit-Supine [place] (Bed Mobility) contact guard;nonverbal cues (demo/gesture)   Transfers   Transfers sit-stand transfer   Impairments Contributing to Impaired Transfers pain;decreased strength   Sit-Stand Transfer   Sit-Stand [place] (Transfers) contact guard;verbal cues   Assistive Device (Sit-Stand Transfers) walker, front-wheeled   Gait/Stairs (Locomotion)   [place] Level (Gait) minimum assist (75% patient effort);2 person assist;verbal cues   Assistive Device (Gait) walker, front-wheeled   Distance in Feet (Required for LE Total Joints) 30, 10   Pattern (Gait) step-through   Deviations/Abnormal Patterns (Gait) omid decreased;festinating/shuffling;gait speed decreased   Maintains  Weight-bearing Status (Gait) able to maintain   Clinical Impression   Criteria for Skilled Therapeutic Intervention yes, treatment indicated   PT Diagnosis (PT) Impaired functional mobility   Influenced by the following impairments weakness, pain, impaired balance   Functional limitations due to impairments Transfers, gait, stairs   Clinical Presentation Evolving/Changing   Clinical Presentation Rationale Pt presents as medically diagnosed   Clinical Decision Making (Complexity) moderate complexity   Therapy Frequency (PT) Daily   Predicted Duration of Therapy Intervention (days/wks) 5 days   Planned Therapy Interventions (PT) gait training;home exercise program;transfer training;strengthening;stair training;patient/family education;bed mobility training   Anticipated Equipment Needs at Discharge (PT) walker, rolling;gait belt   Risk & Benefits of therapy have been explained evaluation/treatment results reviewed;care plan/treatment goals reviewed;patient   PT Discharge Planning    PT Discharge Recommendation (DC Rec) home with home care physical therapy   PT Rationale for DC Rec Needs assist with all mobility, support from spouse and family at home   Total Evaluation Time   Total Evaluation Time (Minutes) 10

## 2021-12-22 NOTE — PHARMACY-ADMISSION MEDICATION HISTORY
Pharmacy Note - Admission Medication History    Pertinent Provider Information: n/a   ______________________________________________________________________    Prior To Admission (PTA) med list completed and updated in EMR.       PTA Med List   Medication Sig Last Dose     amitriptyline (ELAVIL) 75 MG tablet Take 1 tablet (75 mg) by mouth At Bedtime 12/20/2021     buPROPion (WELLBUTRIN XL) 300 MG 24 hr tablet Take 300 mg by mouth daily  12/22/2021 at Unknown time     clonazePAM (KLONOPIN) 1 MG tablet Take 1 mg by mouth 2 times daily as needed  12/21/2021 at Unknown time     divalproex sodium delayed-release (DEPAKOTE) 500 MG DR tablet Take 1 tablet (500 mg) by mouth 2 times daily 12/22/2021 at x1     gabapentin (NEURONTIN) 300 MG capsule Take 1 capsule (300 mg) by mouth At Bedtime 12/21/2021 at Unknown time     hydrOXYzine pamoate (VISTARIL) 25 MG capsule Take 25 mg by mouth 2 times daily as needed  prn     nabumetone (RELAFEN) 500 MG tablet TAKE 1-2 TABLETS (500-1,000 MG) BY MOUTH 2 TIMES DAILY AS NEEDED FOR MODERATE PAIN 12/5/2021     naloxone (NARCAN) 4 MG/0.1ML nasal spray Spray 1 spray in nostril as needed  prn     omeprazole (PRILOSEC) 20 MG capsule Take 20 mg by mouth daily  12/22/2021 at Unknown time     ondansetron (ZOFRAN-ODT) 4 MG disintegrating tablet [ONDANSETRON (ZOFRAN-ODT) 4 MG DISINTEGRATING TABLET] Take 1 tablet (4 mg total) by mouth every 8 (eight) hours as needed for nausea. prn     rizatriptan (MAXALT) 10 MG tablet Take 1 tablet (10 mg) by mouth at onset of headache for migraine May repeat in 2 hours. prn     sertraline (ZOLOFT) 100 MG tablet Take 200 mg by mouth daily  12/22/2021 at Unknown time     tiZANidine (ZANAFLEX) 2 MG tablet Take 1 tablet (2 mg) by mouth 3 times daily 12/22/2021 at x1       Information source(s): Patient and CareEverywhere/SureScripts    Method of interview communication: in-person    Patient was asked about OTC/herbal products specifically.  PTA med list reflects  this.    Based on the pharmacist's assessment, the PTA med list information appears reliable    Allergies were reviewed, assessed, and updated with the patient.      Patient does not use any multi-dose medications prior to admission.     Thank you for the opportunity to participate in the care of this patient.      Samara Trejo RPH     12/22/2021     6:37 AM
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
## 2021-12-23 ENCOUNTER — APPOINTMENT (OUTPATIENT)
Dept: OCCUPATIONAL THERAPY | Facility: CLINIC | Age: 41
DRG: 473 | End: 2021-12-23
Attending: PHYSICIAN ASSISTANT
Payer: COMMERCIAL

## 2021-12-23 ENCOUNTER — APPOINTMENT (OUTPATIENT)
Dept: PHYSICAL THERAPY | Facility: CLINIC | Age: 41
DRG: 473 | End: 2021-12-23
Attending: PHYSICIAN ASSISTANT
Payer: COMMERCIAL

## 2021-12-23 VITALS
HEART RATE: 94 BPM | BODY MASS INDEX: 28.04 KG/M2 | WEIGHT: 189.3 LBS | OXYGEN SATURATION: 94 % | HEIGHT: 69 IN | DIASTOLIC BLOOD PRESSURE: 74 MMHG | TEMPERATURE: 98.3 F | SYSTOLIC BLOOD PRESSURE: 135 MMHG | RESPIRATION RATE: 16 BRPM

## 2021-12-23 PROBLEM — Z98.1 S/P CERVICAL SPINAL FUSION: Status: ACTIVE | Noted: 2021-12-23

## 2021-12-23 PROCEDURE — 97110 THERAPEUTIC EXERCISES: CPT | Mod: GP

## 2021-12-23 PROCEDURE — 99232 SBSQ HOSP IP/OBS MODERATE 35: CPT | Performed by: INTERNAL MEDICINE

## 2021-12-23 PROCEDURE — 97116 GAIT TRAINING THERAPY: CPT | Mod: GP

## 2021-12-23 PROCEDURE — 97535 SELF CARE MNGMENT TRAINING: CPT | Mod: GO

## 2021-12-23 PROCEDURE — 97166 OT EVAL MOD COMPLEX 45 MIN: CPT | Mod: GO

## 2021-12-23 PROCEDURE — 250N000013 HC RX MED GY IP 250 OP 250 PS 637: Performed by: PHYSICIAN ASSISTANT

## 2021-12-23 RX ORDER — METHOCARBAMOL 750 MG/1
750 TABLET, FILM COATED ORAL EVERY 6 HOURS PRN
Qty: 20 TABLET | Refills: 0 | Status: SHIPPED | OUTPATIENT
Start: 2021-12-23 | End: 2022-08-24

## 2021-12-23 RX ORDER — OXYCODONE HYDROCHLORIDE 10 MG/1
10-15 TABLET ORAL EVERY 4 HOURS PRN
Qty: 20 TABLET | Refills: 0 | Status: SHIPPED | OUTPATIENT
Start: 2021-12-23 | End: 2022-07-20

## 2021-12-23 RX ORDER — ACETAMINOPHEN 325 MG/1
975 TABLET ORAL EVERY 8 HOURS
Qty: 90 TABLET | Refills: 0 | Status: SHIPPED | OUTPATIENT
Start: 2021-12-23

## 2021-12-23 RX ORDER — POLYETHYLENE GLYCOL 3350 17 G/17G
17 POWDER, FOR SOLUTION ORAL DAILY
Qty: 510 G | Refills: 0 | Status: SHIPPED | OUTPATIENT
Start: 2021-12-23 | End: 2022-08-24

## 2021-12-23 RX ADMIN — OXYCODONE HYDROCHLORIDE 15 MG: 15 TABLET ORAL at 02:08

## 2021-12-23 RX ADMIN — ACETAMINOPHEN 975 MG: 325 TABLET ORAL at 05:05

## 2021-12-23 RX ADMIN — POLYETHYLENE GLYCOL 3350 17 G: 17 POWDER, FOR SOLUTION ORAL at 08:35

## 2021-12-23 RX ADMIN — OXYCODONE HYDROCHLORIDE 15 MG: 15 TABLET ORAL at 08:35

## 2021-12-23 RX ADMIN — GABAPENTIN 100 MG: 100 CAPSULE ORAL at 08:35

## 2021-12-23 RX ADMIN — ACETAMINOPHEN 975 MG: 325 TABLET ORAL at 11:42

## 2021-12-23 RX ADMIN — SENNOSIDES AND DOCUSATE SODIUM 1 TABLET: 50; 8.6 TABLET ORAL at 08:35

## 2021-12-23 RX ADMIN — OXYCODONE HYDROCHLORIDE 15 MG: 15 TABLET ORAL at 12:52

## 2021-12-23 RX ADMIN — HYDROXYZINE HYDROCHLORIDE 25 MG: 25 TABLET ORAL at 00:40

## 2021-12-23 RX ADMIN — THERA TABS 1 TABLET: TAB at 08:35

## 2021-12-23 RX ADMIN — HYDROXYZINE HYDROCHLORIDE 25 MG: 25 TABLET ORAL at 11:42

## 2021-12-23 ASSESSMENT — ACTIVITIES OF DAILY LIVING (ADL)
ADLS_ACUITY_SCORE: 12
ADLS_ACUITY_SCORE: 13
ADLS_ACUITY_SCORE: 13
ADLS_ACUITY_SCORE: 12
ADLS_ACUITY_SCORE: 13
ADLS_ACUITY_SCORE: 12
DEPENDENT_IADLS:: INDEPENDENT

## 2021-12-23 NOTE — PROGRESS NOTES
"[facility] Medicine PROGRESS NOTE      Identification/Summary:      Alex Feldman is a 41 year old female with a past medical history of traumatic brain injury with a subsequent memory issues, headaches, right arm/leg pain/weakness, issues with focus and insomnia who was admitted on 12/22/2021 for complete block discectomy at C5-6 with a bilateral uncovertebral resections and decompression, placement of a cage.  .     Assessment & Plan      Sedation secondary to medications: Monitor respiratory status, continuous pulse oximetry, avoid Narcan unless significant hypoxia, respiratory rate less than 8/min and patient not arousable  Continue pain management per orthopedics and hold sedative medications if patient is excessively drowsy  ======> resolved and no subsequent issues   History of migraines: Maxalt, amitriptyline, Depakote  Anxiety/depression: Wellbutrin XL 3 mg daily, clonazepam 1 mg twice daily as needed,   Anemia: Hemoglobin 9.9,  Repeat check wasn't ordered, patient is doing well so not ordering at this time  Patient ready for discharge ok to discharge,    D/w patient to take stool softners, no NSAIDs      Diet: Discharge Instruction - Regular Diet Adult  Advance Diet as Tolerated: Regular Diet Adult  DVT Prophylaxis: Mechanical Prophylaxis/ Sequential Compression Devices  Akins Catheter: Not present  Central Lines: None    Code Status: Full Code    Discharge Planning   Anticipated Discharge in :1 day  Milestones/Criteria For Discharge:      Interval History/Subjective:     Patient had a good night, pain has been generally controlled, no paresthesias  No episodes of hypoxia or apneas.  No significant drowsiness when not sleeping overnight or this morning  Physical Exam/Objective:     Vitals I/O   Vital signs:  Temp: 98.3  F (36.8  C) Temp src: Oral BP: 135/74 Pulse: 94   Resp: 16 SpO2: 94 % O2 Device: None (Room air) Oxygen Delivery: 2 LPM Height: 175.3 cm (5' 9\") Weight: 85.9 kg (189 lb 4.8 " "oz)  Estimated body mass index is 27.95 kg/m  as calculated from the following:    Height as of this encounter: 1.753 m (5' 9\").    Weight as of this encounter: 85.9 kg (189 lb 4.8 oz).       I/O last 3 completed shifts:  In: 1220 [P.O.:1120; IV Piggyback:100]  Out: 3135 [Urine:3125; Blood:10]     Vitals:    12/22/21 0620   Weight: 85.9 kg (189 lb 4.8 oz)      Weight change:    Body mass index is 27.95 kg/m .    General: Awake alert and comfortable  Neck: collar in place   Lungs: CTA without rales or rhonchi  Heart: S1, S2 without murmurs  Abdomen: Soft nontender, bowel sounds positive  CNS: Nonfocal  Extremities: No edema      Medications:     Medications     sodium chloride         acetaminophen  975 mg Oral Q8H     gabapentin  100 mg Oral TID     multivitamin, therapeutic  1 tablet Oral Daily     polyethylene glycol  17 g Oral Daily     senna-docusate  1 tablet Oral BID       Data Reviewed   I personally reviewed all new medications, labs, imaging/diagnostics reports over the past 24 hours.     Pertinent findings include.     Labs    Recent Labs   Lab 12/20/21  1638   WBC 5.5   HGB 9.9*   MCV 82         POTASSIUM 4.3   CHLORIDE 105   CO2 23   BUN 9   CR 1.03   ANIONGAP 11   KELSY 8.7   GLC 90       Imaging   No results found for this or any previous visit (from the past 24 hour(s)).      EKG:      Reshma Hwang MD  Internal Medicine / Hospital medicine   [facility]  Securely message with the [employer] Web Console (learn more here)  Text page via [employer] ([employer])     "

## 2021-12-23 NOTE — PLAN OF CARE
Problem: Postoperative Urinary Retention (Spinal Surgery)  Goal: Effective Urinary Elimination  Outcome: Improving  Patient vital signs are at baseline: Yes  Patient able to ambulate as they were prior to admission or with assist devices provided by therapies during their stay:  Yes  Patient MUST void prior to discharge:  Yes  Patient able to tolerate oral intake:  Yes  Pain has adequate pain control using Oral analgesics:  Yes    Patient is alert and oriented, call light appropriate and within reach. Bed alarm on. Up with assist of one staff member, walker and belt. Voiding adequate amounts with no rentention. IV is SL. VSS on room air. Managing pain with scheduled tylenol, prn oxy, atarax, rest, repositioning, cervical collar, distraction and quiet environment. Surgical dressing CDI. Patient is hopeful to transition home today once medically cleared.

## 2021-12-23 NOTE — DISCHARGE SUMMARY
ORTHOPEDIC DISCHARGE SUMMARY       Alex Feldman,  1980, MRN [id number]    Admission Date: 2021  Admission Diagnoses: Cervical radicular pain [M54.12]  Herniation of intervertebral disc at C5-C6 level [M50.222]  Neck pain [M54.2]  Neurogenic claudication (H) [G95.19]     Discharge Date:  21    Post-operative Day:  1 Day Post-Op    Reason for Admission: The patient was admitted for the following:  Procedure(s):  BILATERAL CERVICAL 5-6 ANTERIOR CERVICAL DECOMPRESSION FUSION      BRIEF HOSPITAL COURSE   This 41 year old female underwent the aforementioned procedure with Dr. Buenrostro on 21. There were no intraoperative complications and the patient was transferred to the recovery room and later the orthopedic unit in stable condition. Once the patient reached the orthopedic floor our orthopedic pain protocol was implemented along with the following:    Anticoagulation Medications: None  Therapy: PT and OT  Activity: WBAT avoid lifting over a gallon of milk for 6 weeks  Bracing: Soft collar for comfort    Consultations during Admission: Hospitalist service for medical management     COMPLICATIONS/SIGNIFICANT FINDINGS    None    DISCHARGE INFORMATION   Condition at discharge: Good  Discharge destination: Home with home cares  Patient was seen by myself on the date of discharge.    FOLLOW UP CARE   Follow up with orthopedics in 6 weeks or sooner should the need arise. Ortho will continue to manage pain control, post op anticoagulation and incision care.     Follow up with your PCP for management of chronic medical problems and to evaluate for post op medical complications including constipation, nausea/vomiting, DVT/PE, anemia, changes in blood pressure, fevers/chills, urinary retention and atelectasis/pneumonia.     Subjective   Patient is doing well today. Patient has passed her therapies. She is using Tylenol and Oxycodone for pain which she is tolerating well. She feels ready to discharge home.  "No other complaints. All questions answered.     Physical Exam   The patient is A&Ox3.  Sensation is intact.  5/5 BUE motor strength  Radial pulses intact.  The incision is covered. Dressing C/D/I. Soft collar in place.    /74 (BP Location: Right arm)   Pulse 94   Temp 98.3  F (36.8  C) (Oral)   Resp 16   Ht 1.753 m (5' 9\")   Wt 85.9 kg (189 lb 4.8 oz)   LMP 12/16/2021   SpO2 94%   BMI 27.95 kg/m      Pertinent Results at Discharge     Hemoglobin   Date/Time Value Ref Range Status   12/20/2021 04:38 PM 9.9 (L) 11.7 - 15.7 g/dL Final   08/26/2021 12:29 PM 10.6 (L) 11.7 - 15.7 g/dL Final   01/25/2021 04:49 PM 11.1 (L) 12.0 - 16.0 g/dL Final     Platelet Count   Date/Time Value Ref Range Status   12/20/2021 04:38  150 - 450 10e3/uL Final   08/26/2021 12:29  150 - 450 10e3/uL Final   01/25/2021 04:49  140 - 440 thou/uL Final       Problem List   Active Problems:    Neurogenic claudication (H)    S/P cervical spinal fusion        Keesha Moore PA-C  Date: 12/23/2021  Time: 10:46 AM    "

## 2021-12-23 NOTE — PLAN OF CARE
Patient vital signs are at baseline: Yes  Patient able to ambulate as they were prior to admission or with assist devices provided by therapies during their stay:  Yes  Patient MUST void prior to discharge:  No,  Reason:  voiding with retention.  Patient able to tolerate oral intake:  Yes  Pain has adequate pain control using Oral analgesics:  Yes    Problem: Pain (Spinal Surgery)  Goal: Acceptable Pain Control  Outcome: Improving  Intervention: Prevent or Manage Pain  Recent Flowsheet Documentation  Taken 12/22/2021 2156 by Jade Spencer RN  Pain Management Interventions: medication (see MAR)       Patient is alert and oriented, call light appropriate and within reach. Up to the bathroom with assist of one staff member, walker and belt. Voiding small amount with urine retention, refusing to be cathed. Patient VSS on room air. Reporting severe pain that is being managed with scheduled tylenol, prn oxy, atarax, rest, repositioning, soft cervical collar, distraction and quiet environment.

## 2021-12-23 NOTE — CONSULTS
"[facility] Medicine PROGRESS NOTE      Identification/Summary:      Alex Feldman is a 41 year old female with a past medical history of traumatic brain injury with a subsequent memory issues, headaches, right arm/leg pain/weakness, issues with focus and insomnia who was admitted on 12/22/2021 for complete block discectomy at C5-6 with a bilateral uncovertebral resections and decompression, placement of a cage.  .     Assessment & Plan      Sedation secondary to medications: Monitor respiratory status, continuous pulse oximetry, avoid Narcan unless significant hypoxia, respiratory rate less than 8/min and patient not arousable  Continue pain management per orthopedics and hold sedative medications if patient is excessively drowsy  ======> resolved and no subsequent issues   History of migraines: Maxalt, amitriptyline, Depakote  Anxiety/depression: Wellbutrin XL 3 mg daily, clonazepam 1 mg twice daily as needed,   Anemia: Hemoglobin 9.9,  Repeat check wasn't ordered, patient is doing well so not ordering at this time  Patient ready for discharge ok to discharge,    D/w patient to take stool softners, no NSAIDs      Diet: Discharge Instruction - Regular Diet Adult  Advance Diet as Tolerated: Regular Diet Adult  DVT Prophylaxis: Mechanical Prophylaxis/ Sequential Compression Devices  Akins Catheter: Not present  Central Lines: None    Code Status: Full Code    Discharge Planning   Anticipated Discharge in :1 day  Milestones/Criteria For Discharge:      Interval History/Subjective:     Patient had a good night, pain has been generally controlled, no paresthesias  No episodes of hypoxia or apneas.  No significant drowsiness when not sleeping overnight or this morning  Physical Exam/Objective:     Vitals I/O   Vital signs:  Temp: 98.3  F (36.8  C) Temp src: Oral BP: 135/74 Pulse: 94   Resp: 16 SpO2: 94 % O2 Device: None (Room air) Oxygen Delivery: 2 LPM Height: 175.3 cm (5' 9\") Weight: 85.9 kg (189 lb 4.8 " "oz)  Estimated body mass index is 27.95 kg/m  as calculated from the following:    Height as of this encounter: 1.753 m (5' 9\").    Weight as of this encounter: 85.9 kg (189 lb 4.8 oz).       I/O last 3 completed shifts:  In: 1220 [P.O.:1120; IV Piggyback:100]  Out: 3135 [Urine:3125; Blood:10]     Vitals:    12/22/21 0620   Weight: 85.9 kg (189 lb 4.8 oz)      Weight change:    Body mass index is 27.95 kg/m .    General: Awake alert and comfortable  Neck: collar in place   Lungs: CTA without rales or rhonchi  Heart: S1, S2 without murmurs  Abdomen: Soft nontender, bowel sounds positive  CNS: Nonfocal  Extremities: No edema      Medications:     Medications     sodium chloride         acetaminophen  975 mg Oral Q8H     gabapentin  100 mg Oral TID     multivitamin, therapeutic  1 tablet Oral Daily     polyethylene glycol  17 g Oral Daily     senna-docusate  1 tablet Oral BID       Data Reviewed   I personally reviewed all new medications, labs, imaging/diagnostics reports over the past 24 hours.     Pertinent findings include.     Labs    Recent Labs   Lab 12/20/21  1638   WBC 5.5   HGB 9.9*   MCV 82         POTASSIUM 4.3   CHLORIDE 105   CO2 23   BUN 9   CR 1.03   ANIONGAP 11   KELSY 8.7   GLC 90       Imaging   No results found for this or any previous visit (from the past 24 hour(s)).      EKG:      Reshma Hwang MD  Internal Medicine / Hospital medicine   [facility]  Securely message with the Vocera Web Console (learn more here)  Text page via [employer] (MyBuilder)     "

## 2021-12-23 NOTE — PLAN OF CARE
Patient vital signs are at baseline: Yes  Patient able to ambulate as they were prior to admission or with assist devices provided by therapies during their stay:  Yes  Patient MUST void prior to discharge:  Yes  Patient able to tolerate oral intake:  Yes  Pain has adequate pain control using Oral analgesics:  Yes    Voiding but also retaining urine. Patient declined straight cath at this time.

## 2021-12-23 NOTE — PROGRESS NOTES
Care Management Initial Consult    General Information  Assessment completed with: Patient,    Type of CM/SW Visit: Initial Assessment    Primary Care Provider verified and updated as needed: Yes         Advance Care Planning: Patient does not have Health Care Directive. Not interested at this time.            Communication Assessment  Patient's communication style: spoken language (English or Bilingual)    Hearing Difficulty or Deaf: no   Wear Glasses or Blind: yes    Cognitive  Cognitive/Neuro/Behavioral: WDL  Level of Consciousness: alert  Arousal Level: arouses to voice  Orientation: oriented x 4  Mood/Behavior: anxious     Speech: clear    Living Environment:   People in home: child(gianni), dependent,spouse (3 sons (full time), 2 daughters (weekends only))  [place]   Current living Arrangements: house (split entry)      Able to return to prior arrangements: yes       Family/Social Support:  Care provided by: self  Provides care for: child(gianni)  Marital Status:             Description of Support System: Supportive,Involved         Current Resources:   Patient receiving home care services: No     Community Resources: None  Equipment currently used at home: none  Supplies currently used at home: None    Employment/Financial:  Employment Status: employed full-time (from home)     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 PT recommendations for home with assist; Home PT.  CM completed assessment. Patient lives with  and their children (3 sons full time, 2 daughters every other weekend) in private home. Independent with  all cares at baseline, including driving, works full time from home. Wears Rx glasses, otherwise no assistive devices. No home care services.  is a Vet. PCP confirmed.     Patient is agreeable to Home Care services for PT with any agency able to provide services. CM will send referrals.     Patient and  discussed hopeful to discharge by 11am. If patient discharges prior to  for PT being arranged CM will follow up with patient ([phone]) and/or  [place] ([phone])     11:43 AM  [facility] unable to accept due to capacity. Additional Home Care referrals sent.   Gerda Benson RN

## 2021-12-23 NOTE — DISCHARGE INSTRUCTIONS
Jae Buenrostro MD    Attending    Since 12/22/2021    [phone]       Juliet Spencer MD    General - Family Medicine    [phone]

## 2021-12-23 NOTE — PROGRESS NOTES
Care Management Discharge Note    Discharge Date: 12/23/2021       Discharge Disposition: Home with  and [facility] for PT.   Discharge Services: [facility] for PT.     Discharge DME: Other (see comment) (per therapy/Ortho)    Discharge Transportation:  to transport     Private pay costs discussed: Not applicable    Education Provided on the Discharge Plan:  CM reviewed plan for Home Care for PT. AVS per bedside RN. CM called patient and provided contact information for [facility] (phone [phone]) who accepted.   Persons Notified of Discharge Plans: patient  Patient/Family in Agreement with the Plan: yes    Handoff Referral Completed: KIMBERLY faxed discharge orders to [facility]. CM coordinated with MD and Charge RN.     Additional Information:  [facility] accepted for PT. CM faxed discharge orders. CM called patient via phone and provided name and phone number for accepting agency.         Gerda Benson, RN

## 2021-12-23 NOTE — PLAN OF CARE
Physical Therapy Discharge Summary    Reason for therapy discharge:    All goals and outcomes met, no further needs identified.    Progress towards therapy goal(s). See goals on Care Plan in [facility] electronic health record for goal details.  Goals met    Therapy recommendation(s):    Continued therapy is recommended.  Rationale/Recommendations:  Home PT.  Continue home exercise program.

## 2021-12-23 NOTE — PLAN OF CARE
Occupational Therapy Discharge Summary    Reason for therapy discharge:    Discharged to home.    Progress towards therapy goal(s). See goals on Care Plan in [facility] electronic health record for goal details.  Goals met    Therapy recommendation(s):    No further therapy is recommended.

## 2021-12-23 NOTE — PROGRESS NOTES
12/23/21 0930   Quick Adds   Type of Visit Initial Occupational Therapy Evaluation   Living Environment   People in home spouse   Current Living Arrangements house  (split entry)   Home Accessibility stairs to enter home;stairs within home   Number of Stairs, Main Entrance 3   Number of Stairs, Within Home, Primary 10   Stair Railings, Within Home, Primary railings on both sides of stairs   Transportation Anticipated family or friend will provide   Living Environment Comments WIS with built in seat; Std toilet with vanity on L and cordova on R   Self-Care   Usual Activity Tolerance good   Current Activity Tolerance good   Equipment Currently Used at Home walker, rolling   Disability/Function   Hearing Difficulty or Deaf no   Wear Glasses or Blind yes   Vision Management glasses   Concentrating, Remembering or Making Decisions Difficulty yes  (due to TBI)   General Information   Onset of Illness/Injury or Date of Surgery 12/22/21   Referring Physician Dr. Jae Buenrostro   Patient/Family Therapy Goal Statement (OT) To return home.   Existing Precautions/Restrictions spinal   Cognitive Status Examination   Orientation Status orientation to person, place and time   Follows Commands WFL   Bed Mobility   Bed Mobility rolling left;supine-sit;sit-supine   Rolling Left [place] (Bed Mobility) supervision;modified independence   Supine-Sit [place] (Bed Mobility) supervision;modified independence   Sit-Supine [place] (Bed Mobility) supervision;modified independence   Comment (Bed Mobility) Using log rolling technique.   Transfers   Transfers bed-chair transfer;sit-stand transfer;toilet transfer;shower transfer   Transfer Skill: Bed to Chair/Chair to Bed   Bed-Chair [place] (Transfers) supervision;modified independence   Assistive Device (Bed-Chair Transfers) rolling walker   Sit-Stand Transfer   Sit-Stand [place] (Transfers) supervision;modified independence   Assistive Device (Sit-Stand Transfers)  walker, front-wheeled   Shower Transfer   Type (Shower Transfer) stand pivot/stand step   [place] (Shower Transfer) supervision;modified independence   Assistive Device (Shower Transfer) walker, front-wheeled   Toilet Transfer   Type (Toilet Transfer) stand-sit;sit-stand   [place] (Toilet Transfer) supervision;modified independence   Assistive Device (Toilet Transfer) grab bars/safety frame;walker, front-wheeled  (using vanity and wall or middle of walker)   Activities of Daily Living   BADL Assessment upper body dressing;lower body dressing;grooming   Upper Body Dressing Assessment   [place] (Upper Body Dressing) supervision;modified independence   Position (Upper Body Dressing) supported sitting   Lower Body Dressing Assessment   [place] (Lower Body Dressing) supervision;modified independence   Position (Lower Body Dressing) supported sitting;supported standing   Grooming Assessment   [place] (Grooming) supervision;modified independence   Position (Grooming) supported standing   Clinical Impression   Criteria for Skilled Therapeutic Interventions Met (OT) yes   OT Diagnosis decreased indep with ADLs due to Discectomy C5-6 bilat.   OT Problem List-Impairments impacting ADL mobility   Assessment of Occupational Performance 3-5 Performance Deficits   Identified Performance Deficits dressing, toileting, trsfs, G/H   Planned Therapy Interventions (OT) ADL retraining   Clinical Decision Making Complexity (OT) moderate complexity   Therapy Frequency (OT) Daily   Predicted Duration of Therapy 1 day   Risk & Benefits of therapy have been explained patient;spouse/significant other   OT Discharge Planning    OT Discharge Recommendation (DC Rec) Home with assist   OT Rationale for DC Rec Spouse will be available to assist at home.   Total Evaluation Time (Minutes)   Total Evaluation Time (Minutes) 15

## 2021-12-24 NOTE — PLAN OF CARE
Problem: Adult Inpatient Plan of Care  Goal: Absence of Hospital-Acquired Illness or Injury  Outcome: Adequate for Discharge  Intervention: Identify and Manage Fall Risk  Recent Flowsheet Documentation  Taken 12/23/2021 9383 by Corine Thorne RN  Safety Promotion/Fall Prevention:   activity supervised   assistive device/personal items within reach   clutter free environment maintained   fall prevention program maintained   increased rounding and observation   mobility aid in reach   nonskid shoes/slippers when out of bed   patient and family education   room door open     Problem: Adult Inpatient Plan of Care  Goal: Readiness for Transition of Care  Outcome: Adequate for Discharge    Patient discharged to home with spouse. Discharge instruction given and patient and spouse verbalized understanding. Belongings returned to patient.

## 2021-12-30 NOTE — TELEPHONE ENCOUNTER
Patient due for Pap and HPV.    Reminder done today via telephone call.    Pt notified, transferred to scheduling

## 2022-01-08 DIAGNOSIS — Z20.822 ENCOUNTER FOR LABORATORY TESTING FOR COVID-19 VIRUS: Primary | ICD-10-CM

## 2022-01-18 VITALS
HEART RATE: 109 BPM | SYSTOLIC BLOOD PRESSURE: 104 MMHG | TEMPERATURE: 101.8 F | OXYGEN SATURATION: 95 % | RESPIRATION RATE: 20 BRPM | DIASTOLIC BLOOD PRESSURE: 73 MMHG

## 2022-01-27 DIAGNOSIS — S06.9X9D MILD TRAUMATIC BRAIN INJURY WITH LOSS OF CONSCIOUSNESS, SUBSEQUENT ENCOUNTER: ICD-10-CM

## 2022-01-27 RX ORDER — TIZANIDINE 2 MG/1
TABLET ORAL
Qty: 90 TABLET | Refills: 1 | Status: SHIPPED | OUTPATIENT
Start: 2022-01-27 | End: 2022-02-21

## 2022-01-27 NOTE — TELEPHONE ENCOUNTER
Refill request for tizanidine. Pt last seen 12/15/21 and has follow up scheduled for 2/14/22. Will send in refills to get pt through to this appt.     Kim Eaton RN on 1/27/2022 at 11:38 AM

## 2022-01-28 PROBLEM — B97.7 HIGH RISK HPV INFECTION: Status: ACTIVE | Noted: 2020-12-20

## 2022-01-28 NOTE — TELEPHONE ENCOUNTER
FYI to provider - Patient is lost to pap tracking follow-up. Attempts to contact pt have been made per reminder process and there has been no reply and/or no appt scheduled.       12/4/14 NIL pap, neg HPV  9/28/18 NIL pap, +HR HPV (not 16/18)  12/17/20 NIL pap, neg HPV. Plan: cotest in 1 year  12/3/21 Reminder Letter   12/30/21 Reminder call -- Pt notified   1/28/22 Lost to follow-up for pap tracking       Astrid Main RN BSN, Pap Tracking

## 2022-02-10 ENCOUNTER — TRANSFERRED RECORDS (OUTPATIENT)
Dept: HEALTH INFORMATION MANAGEMENT | Facility: CLINIC | Age: 42
End: 2022-02-10
Payer: COMMERCIAL

## 2022-02-18 DIAGNOSIS — S06.9X9D MILD TRAUMATIC BRAIN INJURY WITH LOSS OF CONSCIOUSNESS, SUBSEQUENT ENCOUNTER: ICD-10-CM

## 2022-02-21 RX ORDER — TIZANIDINE 2 MG/1
TABLET ORAL
Qty: 270 TABLET | Refills: 1 | Status: SHIPPED | OUTPATIENT
Start: 2022-02-21

## 2022-02-21 NOTE — TELEPHONE ENCOUNTER
Refill request for Zanaflex. Pt last seen 12/15/21 and has follow up scheduled for 5/24/22. Will send in refills to get pt through to this appt.     Kim Eaton RN on 2/21/2022 at 8:55 AM

## 2022-03-26 DIAGNOSIS — G89.4 CHRONIC PAIN SYNDROME: ICD-10-CM

## 2022-03-28 RX ORDER — NABUMETONE 500 MG/1
500-1000 TABLET, FILM COATED ORAL 2 TIMES DAILY PRN
Qty: 90 TABLET | Refills: 1 | Status: SHIPPED | OUTPATIENT
Start: 2022-03-28 | End: 2022-07-20

## 2022-04-07 ENCOUNTER — TRANSFERRED RECORDS (OUTPATIENT)
Dept: HEALTH INFORMATION MANAGEMENT | Facility: CLINIC | Age: 42
End: 2022-04-07
Payer: COMMERCIAL

## 2022-04-07 LAB — PHQ9 SCORE: 16

## 2022-04-18 ENCOUNTER — TRANSFERRED RECORDS (OUTPATIENT)
Dept: HEALTH INFORMATION MANAGEMENT | Facility: CLINIC | Age: 42
End: 2022-04-18
Payer: COMMERCIAL

## 2022-07-20 ENCOUNTER — OFFICE VISIT (OUTPATIENT)
Dept: FAMILY MEDICINE | Facility: CLINIC | Age: 42
End: 2022-07-20
Payer: COMMERCIAL

## 2022-07-20 VITALS
HEART RATE: 68 BPM | BODY MASS INDEX: 27.9 KG/M2 | RESPIRATION RATE: 17 BRPM | WEIGHT: 188.9 LBS | SYSTOLIC BLOOD PRESSURE: 98 MMHG | DIASTOLIC BLOOD PRESSURE: 70 MMHG

## 2022-07-20 DIAGNOSIS — R05.3 CHRONIC COUGH: ICD-10-CM

## 2022-07-20 DIAGNOSIS — K27.9 PUD (PEPTIC ULCER DISEASE): ICD-10-CM

## 2022-07-20 DIAGNOSIS — F43.10 PTSD (POST-TRAUMATIC STRESS DISORDER): ICD-10-CM

## 2022-07-20 DIAGNOSIS — J30.89 ENVIRONMENTAL AND SEASONAL ALLERGIES: ICD-10-CM

## 2022-07-20 DIAGNOSIS — D50.0 IRON DEFICIENCY ANEMIA DUE TO CHRONIC BLOOD LOSS: ICD-10-CM

## 2022-07-20 DIAGNOSIS — D64.9 ANEMIA, UNSPECIFIED TYPE: ICD-10-CM

## 2022-07-20 DIAGNOSIS — R10.13 ABDOMINAL PAIN, EPIGASTRIC: Primary | ICD-10-CM

## 2022-07-20 DIAGNOSIS — N63.20 LEFT BREAST MASS: ICD-10-CM

## 2022-07-20 DIAGNOSIS — F43.23 ADJUSTMENT DISORDER WITH MIXED ANXIETY AND DEPRESSED MOOD: ICD-10-CM

## 2022-07-20 DIAGNOSIS — R14.0 ABDOMINAL BLOATING: ICD-10-CM

## 2022-07-20 DIAGNOSIS — R23.3 ABNORMAL BRUISING: ICD-10-CM

## 2022-07-20 DIAGNOSIS — Z87.09 HISTORY OF ASTHMA: ICD-10-CM

## 2022-07-20 PROBLEM — K21.9 GASTROESOPHAGEAL REFLUX DISEASE WITHOUT ESOPHAGITIS: Status: ACTIVE | Noted: 2021-01-22

## 2022-07-20 PROBLEM — K59.00 CONSTIPATION: Status: ACTIVE | Noted: 2022-07-20

## 2022-07-20 PROBLEM — K62.5 HEMORRHAGE OF RECTUM AND ANUS: Status: ACTIVE | Noted: 2021-04-27

## 2022-07-20 PROBLEM — D18.03 HEMANGIOMA OF LIVER: Status: ACTIVE | Noted: 2022-07-20

## 2022-07-20 LAB
ALBUMIN SERPL BCG-MCNC: 4.9 G/DL (ref 3.5–5.2)
ALP SERPL-CCNC: 70 U/L (ref 35–104)
ALT SERPL W P-5'-P-CCNC: 16 U/L (ref 10–35)
AMYLASE SERPL-CCNC: 31 U/L (ref 28–100)
ANION GAP SERPL CALCULATED.3IONS-SCNC: 11 MMOL/L (ref 7–15)
APTT PPP: 31 SECONDS (ref 22–38)
AST SERPL W P-5'-P-CCNC: 21 U/L (ref 10–35)
BASOPHILS # BLD AUTO: 0 10E3/UL (ref 0–0.2)
BASOPHILS NFR BLD AUTO: 1 %
BILIRUB SERPL-MCNC: 0.2 MG/DL
BUN SERPL-MCNC: 11.3 MG/DL (ref 6–20)
CALCIUM SERPL-MCNC: 9.3 MG/DL (ref 8.6–10)
CHLORIDE SERPL-SCNC: 104 MMOL/L (ref 98–107)
CREAT SERPL-MCNC: 0.98 MG/DL (ref 0.51–0.95)
DEPRECATED HCO3 PLAS-SCNC: 25 MMOL/L (ref 22–29)
EOSINOPHIL # BLD AUTO: 0.1 10E3/UL (ref 0–0.7)
EOSINOPHIL NFR BLD AUTO: 2 %
ERYTHROCYTE [DISTWIDTH] IN BLOOD BY AUTOMATED COUNT: 16.9 % (ref 10–15)
FERRITIN SERPL-MCNC: 17 NG/ML (ref 6–175)
GFR SERPL CREATININE-BSD FRML MDRD: 73 ML/MIN/1.73M2
GLUCOSE SERPL-MCNC: 98 MG/DL (ref 70–99)
HCT VFR BLD AUTO: 34.3 % (ref 35–47)
HGB BLD-MCNC: 10.3 G/DL (ref 11.7–15.7)
IMM GRANULOCYTES # BLD: 0 10E3/UL
IMM GRANULOCYTES NFR BLD: 0 %
INR PPP: 1.09 (ref 0.85–1.15)
IRON BINDING CAPACITY (ROCHE): 458 UG/DL (ref 240–430)
IRON SATN MFR SERPL: 3 % (ref 15–46)
IRON SERPL-MCNC: 14 UG/DL (ref 37–145)
LIPASE SERPL-CCNC: 55 U/L (ref 13–60)
LYMPHOCYTES # BLD AUTO: 1.8 10E3/UL (ref 0.8–5.3)
LYMPHOCYTES NFR BLD AUTO: 26 %
MCH RBC QN AUTO: 23 PG (ref 26.5–33)
MCHC RBC AUTO-ENTMCNC: 30 G/DL (ref 31.5–36.5)
MCV RBC AUTO: 77 FL (ref 78–100)
MONOCYTES # BLD AUTO: 0.4 10E3/UL (ref 0–1.3)
MONOCYTES NFR BLD AUTO: 6 %
NEUTROPHILS # BLD AUTO: 4.4 10E3/UL (ref 1.6–8.3)
NEUTROPHILS NFR BLD AUTO: 65 %
PLATELET # BLD AUTO: 333 10E3/UL (ref 150–450)
POTASSIUM SERPL-SCNC: 4.3 MMOL/L (ref 3.4–5.3)
PROT SERPL-MCNC: 8 G/DL (ref 6.4–8.3)
RBC # BLD AUTO: 4.48 10E6/UL (ref 3.8–5.2)
SODIUM SERPL-SCNC: 140 MMOL/L (ref 136–145)
TRANSFERRIN SERPL-MCNC: 378 MG/DL (ref 200–360)
VIT B12 SERPL-MCNC: 695 PG/ML (ref 232–1245)
WBC # BLD AUTO: 6.8 10E3/UL (ref 4–11)

## 2022-07-20 PROCEDURE — 36415 COLL VENOUS BLD VENIPUNCTURE: CPT | Performed by: FAMILY MEDICINE

## 2022-07-20 PROCEDURE — 99417 PROLNG OP E/M EACH 15 MIN: CPT | Performed by: FAMILY MEDICINE

## 2022-07-20 PROCEDURE — 82607 VITAMIN B-12: CPT | Performed by: FAMILY MEDICINE

## 2022-07-20 PROCEDURE — 85025 COMPLETE CBC W/AUTO DIFF WBC: CPT | Performed by: FAMILY MEDICINE

## 2022-07-20 PROCEDURE — 82728 ASSAY OF FERRITIN: CPT | Performed by: FAMILY MEDICINE

## 2022-07-20 PROCEDURE — 83690 ASSAY OF LIPASE: CPT | Performed by: FAMILY MEDICINE

## 2022-07-20 PROCEDURE — 82150 ASSAY OF AMYLASE: CPT | Performed by: FAMILY MEDICINE

## 2022-07-20 PROCEDURE — 99215 OFFICE O/P EST HI 40 MIN: CPT | Performed by: FAMILY MEDICINE

## 2022-07-20 PROCEDURE — 84466 ASSAY OF TRANSFERRIN: CPT | Performed by: FAMILY MEDICINE

## 2022-07-20 PROCEDURE — 80053 COMPREHEN METABOLIC PANEL: CPT | Performed by: FAMILY MEDICINE

## 2022-07-20 PROCEDURE — 85610 PROTHROMBIN TIME: CPT | Performed by: FAMILY MEDICINE

## 2022-07-20 PROCEDURE — 83540 ASSAY OF IRON: CPT | Performed by: FAMILY MEDICINE

## 2022-07-20 PROCEDURE — 96127 BRIEF EMOTIONAL/BEHAV ASSMT: CPT | Performed by: FAMILY MEDICINE

## 2022-07-20 PROCEDURE — 85730 THROMBOPLASTIN TIME PARTIAL: CPT | Performed by: FAMILY MEDICINE

## 2022-07-20 RX ORDER — VENLAFAXINE HYDROCHLORIDE 37.5 MG/1
CAPSULE, EXTENDED RELEASE ORAL
COMMUNITY
Start: 2022-05-23

## 2022-07-20 RX ORDER — FLUTICASONE PROPIONATE 50 MCG
1 SPRAY, SUSPENSION (ML) NASAL 2 TIMES DAILY
Qty: 16 G | Refills: 0 | Status: SHIPPED | OUTPATIENT
Start: 2022-07-20

## 2022-07-20 RX ORDER — BENZONATATE 200 MG/1
200 CAPSULE ORAL 3 TIMES DAILY PRN
Qty: 30 CAPSULE | Refills: 0 | Status: SHIPPED | OUTPATIENT
Start: 2022-07-20

## 2022-07-20 RX ORDER — ALBUTEROL SULFATE 90 UG/1
2 AEROSOL, METERED RESPIRATORY (INHALATION) EVERY 6 HOURS
Qty: 18 G | Refills: 0 | Status: SHIPPED | OUTPATIENT
Start: 2022-07-20 | End: 2023-03-30

## 2022-07-20 ASSESSMENT — PATIENT HEALTH QUESTIONNAIRE - PHQ9
SUM OF ALL RESPONSES TO PHQ QUESTIONS 1-9: 15
SUM OF ALL RESPONSES TO PHQ QUESTIONS 1-9: 15
10. IF YOU CHECKED OFF ANY PROBLEMS, HOW DIFFICULT HAVE THESE PROBLEMS MADE IT FOR YOU TO DO YOUR WORK, TAKE CARE OF THINGS AT HOME, OR GET ALONG WITH OTHER PEOPLE: SOMEWHAT DIFFICULT

## 2022-07-20 NOTE — PATIENT INSTRUCTIONS
Call numbers on referrals to schedule with allergy asthma and GI    Start Flonase1 spray in each nostril in am and bedtime     Start Mucinex -1200 mg daily -the generic is fine -for congestion/sinus congestion-    Start claritin 10 mg daily    Take benadryl ( diphenhydramine) at bedtime will help with congestion and cough will make you sleepy        Take tessalon perles ( benzonatate) pills for cough up to 3 times a day will not make you sleepy        Start albuterol inhaler 2 puffs in a.m. and bedtime and every 4-6 hours as needed for wheezing  if you are having a coughing spell you can take 2 puffs as needed to help decrease the cough    Call your psychiatrist regarding cost of medication and sleep issues    Schedule follow up with Dr. Spencer for pap/annual     Will contact you regarding imaging for breast mass

## 2022-07-20 NOTE — PROGRESS NOTES
ASSESSMENT/PLAN:      ICD-10-CM    1. Abdominal pain, epigastric  R10.13 CBC with platelets and differential     Comprehensive metabolic panel     Adult GI  Referral - Consult Only     Lipase     Amylase     CBC with platelets and differential     Comprehensive metabolic panel     Lipase     Amylase   2. Abdominal bloating  R14.0 CBC with platelets and differential     Comprehensive metabolic panel     Adult GI  Referral - Consult Only     CBC with platelets and differential     Comprehensive metabolic panel   3. Chronic cough  R05.3 Adult Allergy/Asthma Referral     benzonatate (TESSALON) 200 MG capsule     albuterol (PROAIR HFA/PROVENTIL HFA/VENTOLIN HFA) 108 (90 Base) MCG/ACT inhaler     fluticasone (FLONASE) 50 MCG/ACT nasal spray   4. Abnormal bruising  R23.8 CBC with platelets and differential     Comprehensive metabolic panel     INR     Partial thromboplastin time     CBC with platelets and differential     Comprehensive metabolic panel     INR     Partial thromboplastin time    resolving patches on exam, significant brusing on trunk, extremities noted on cell phone photos by patient, labs today, refer to hematology once labs complete    5. Left breast mass  N63.20    6. Iron deficiency anemia due to chronic blood loss  D50.0 CBC with platelets and differential     Iron and iron binding capacity     Transferrin     Ferritin     Vitamin B12     CBC with platelets and differential     Iron and iron binding capacity     Transferrin     Ferritin     Vitamin B12   7. PUD (peptic ulcer disease)  K27.9 Adult GI  Referral - Consult Only   8. History of asthma  Z87.09 Adult Allergy/Asthma Referral   9. Environmental and seasonal allergies  J30.89 Adult Allergy/Asthma Referral     fluticasone (FLONASE) 50 MCG/ACT nasal spray   10. Adjustment disorder with mixed anxiety and depressed mood  F43.23     followed by [facility] psychiatry, wellbutrin out of pocket cost per month now  100, requesting med change-will contact her pyschiatrist    11. PTSD (post-traumatic stress disorder)  F43.10     difficulty sleeping, PHQ9 and GAD7 completed,  denies suicidal ideation/plan-will be contacting psychiatrist /may need fu appt, regarding sleep       Patient Instructions     Call numbers on referrals to schedule with allergy asthma and GI    Start Flonase1 spray in each nostril in am and bedtime     Start Mucinex -1200 mg daily -the generic is fine -for congestion/sinus congestion-    Start claritin 10 mg daily    Take benadryl ( diphenhydramine) at bedtime will help with congestion and cough will make you sleepy        Take tessalon perles ( benzonatate) pills for cough up to 3 times a day will not make you sleepy        Start albuterol inhaler 2 puffs in a.m. and bedtime and every 4-6 hours as needed for wheezing  if you are having a coughing spell you can take 2 puffs as needed to help decrease the cough    Call your psychiatrist regarding cost of medication and sleep issues    Schedule follow up with Dr. Spencer for pap/annual     Will contact you regarding imaging for breast mass    Addendum  7/21/2022   Called radiology recommended diagnostic mammogram bilateral, with implants, ultrasound of the left limited 1-3 views.  Order placed.  In addition CBC indicates continued anemia by additional labs indicate iron deficiency,  patient with significant atypical bruising will refer to hematology for further evaluation.  Patient called,informed patient of all lab results, referral to hematology and given number to call for appointment.  Phone number [phone] given to patient to schedule mammogram.  Patient verbalized understands the plan and review of lab results.     Hilary Perez MD        Reviewed medication instructions and side effects.   Follow up if experiences side effects.. I reviewed supportive care, otc meds to use if needed, expected course, and signs of concern.    Follow up as needed or  if does not improve within 1-2 day(s) or if worsens in any way.  Reviewed red flag symptoms and is to go to the ER if experiences any of these.    On the day of the encounter, time spend on chart review, patient visit, review of testing, documentation was 90  minutes        Subjective   Alex is a 42 year old, presenting for the following health issues:  few things      History of Present Illness       Reason for visit:  Many reasons lol  Symptom onset:  More than a month  Symptom intensity:  Severe  Symptom progression:  Staying the same  Had these symptoms before:  No  What makes it worse:  Eating  What makes it better:  Not eating or eating raw    She eats 4 or more servings of fruits and vegetables daily.She consumes 0 sweetened beverage(s) daily.She exercises with enough effort to increase her heart rate 30 to 60 minutes per day.  She exercises with enough effort to increase her heart rate 5 days per week.   She is taking medications regularly.    Today's PHQ-9         PHQ-9 Total Score: 15    PHQ-9 Q9 Thoughts of better off dead/self-harm past 2 weeks :   Not at all    How difficult have these problems made it for you to do your work, take care of things at home, or get along with other people: Somewhat difficult         GERD/Heartburn/bloating/cramping pain   Onset/Duration: ongoing issue  Of and on bloating  since early 20's. Worse in the last 6 months, notices No melena, noticed blood with wiping rectal area. No blood in stool, normal in color   Notices with fried food, dairy   5 days out of the week -has bloating -some days can not associated with certain foods  Bread-whole wheat bread-bloating, ezecheal bread no bloating   Raw veg/quiona/rice/fruit and chicken, salmon, tuna-no bloating    Gallbladder removed-2019    Description: as noted   Intensity: moderate  Progression of Symptoms: worsening  Accompanying Signs & Symptoms:  Does it feel like food gets stuck or trouble swallowing: No  Nausea: YES with  bloating episodes   Vomiting (bloody?): YES- small amount, with bloating no blood  Abdominal Pain: YES-to point has to go lay down   Black-Tarry stools: No  Bloody stools: YES- as noted   History:  Previous similar episodes: YES  Previous ulcers: YES  Precipitating factors:   Caffeine use: No-1 cup of coffee couple times a week  gretta tea daily -drinking for over 20 years   Alcohol use: No-dose not drink   NSAID/Aspirin use: No  Tobacco use: No  Worse with fatty foods and spicy foods.  Alleviating factors: None-resting and not eating, pain at times difficult to get to sleep,  resolves sleeping through the night   Therapies tried and outcome:             Lifestyle changes: None            Medications: pepto bismol, tums, does not work omeprazole controls heartburn no improvement in bloating, sometimes passing gas helps, history of intermittent diarrhea once a month, constipation in the past due to narcotic meds in the past, now resolved.  Does not resolve with bowel movement      Bruising   Bruising on legs and arms, buttocks-intermittent started a month ago-no known trauma to area- area on legs at present starting to fade   No aspirin, takes ibuprofen-occasional typically takes 2 ( 400 mg) for headaches, pain in lower back, No supplements, no history of similar in the past   History of anemia, heavy menses     Cough -dry hacking cough, comes and goes  Coughing-am hacking mucous-clear occasional light brown, other times foamy for about an hour and then resolves    Asthma as child -on 3 different inhalers,nasal steroid spray, allergy meds benadryl,   Allergist, no allergy but not certain, stopped inhalers age [age], if uri/covid wheezing, then resolves   Cough since covid 1/2021   Used son's inhaler and it has helped, no otc cough meds/decongestants have helped   No wheezing, no dyspnea on exertion, walks for exercise no dyspnea     Left breast lump  Noted night before being seen in urgency room on 6/20/22   In to be  seen for rib pain/cough-6/20/22 zpak and steroid   Cough resolved with z ede and steroid once finished z ede symptoms returned  salin      Ptsd/depression/anxiety   Raised price of Wellbutrin to$100 a month -meds filled by psychiatrist, patient also noted having issues with sleep \denies SI and plan       Not vaccinated for covid    Covid-1/25/2021 -covid test negative fever, cough, could not eat, ER-waited in waiting room 2 hours and left 1/28/2021 -per chart review, seen in urgent care, told based on symptoms, would consider her to be covid positive-test most likely false negative    Review of Systems   Constitutional, HEENT, cardiovascular, pulmonary, GI, , musculoskeletal, neuro, skin, endocrine and psych systems are negative, except as otherwise noted.      Objective    BP 98/70   Pulse 68   Resp 17   Wt 85.7 kg (188 lb 14.4 oz)   BMI 27.90 kg/m    Body mass index is 27.9 kg/m .  Physical Exam   GENERAL: , alert and no distress  NECK: no adenopathy, no asymmetry, and thyroid normal to palpation  EXAM: BREAST:  Right breast -normal to inspection, no nipple discharge, no axillary masses or adenopathy no  masses, non tender with  palpation, breast implants bilateral-intact  Left breast -2 cm mobile mass soft non tender 5 o'clock at outer border of breast tissue, no nipple discharge, no skin changes,  no axillary masses or adenopathy   RESP: lungs clear to auscultation - no rales, rhonchi or wheezes  CV: regular rate and rhythm, normal S1 S2, no S3 or S4, no murmur, click or rub,   ABDOMEN: soft, mild tender in the right upper quadrant.  Moderate tender midepigastric, mild tender left upper quadrant,  and bowel sounds normal  MS: no gross musculoskeletal defects noted, no edema  Skin-resolving patches of ecchymosis on lower arms, legs   NEURO: Normal strength and tone, mentation intact,  and speech normal  sensory exam grossly normal, DTR's normal and symmetric knees  and gait normal   BACK: no CVA  tenderness, no paralumbar tenderness  PSYCH: mentation appears normal, affect mild anxious    Results for orders placed or performed in visit on 07/20/22 (from the past 24 hour(s))   CBC with platelets and differential    Narrative    The following orders were created for panel order CBC with platelets and differential.  Procedure                               Abnormality         Status                     ---------                               -----------         ------                     CBC with platelets and d...[[id number]]  Abnormal            Final result                 Please view results for these tests on the individual orders.   CBC with platelets and differential   Result Value Ref Range    WBC Count 6.8 4.0 - 11.0 10e3/uL    RBC Count 4.48 3.80 - 5.20 10e6/uL    Hemoglobin 10.3 (L) 11.7 - 15.7 g/dL    Hematocrit 34.3 (L) 35.0 - 47.0 %    MCV 77 (L) 78 - 100 fL    MCH 23.0 (L) 26.5 - 33.0 pg    MCHC 30.0 (L) 31.5 - 36.5 g/dL    RDW 16.9 (H) 10.0 - 15.0 %    Platelet Count 333 150 - 450 10e3/uL    % Neutrophils 65 %    % Lymphocytes 26 %    % Monocytes 6 %    % Eosinophils 2 %    % Basophils 1 %    % Immature Granulocytes 0 %    Absolute Neutrophils 4.4 1.6 - 8.3 10e3/uL    Absolute Lymphocytes 1.8 0.8 - 5.3 10e3/uL    Absolute Monocytes 0.4 0.0 - 1.3 10e3/uL    Absolute Eosinophils 0.1 0.0 - 0.7 10e3/uL    Absolute Basophils 0.0 0.0 - 0.2 10e3/uL    Absolute Immature Granulocytes 0.0 <=0.4 10e3/uL                  .  ..
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
## 2022-08-24 ENCOUNTER — OFFICE VISIT (OUTPATIENT)
Dept: HEMATOLOGY | Facility: CLINIC | Age: 42
End: 2022-08-24
Attending: FAMILY MEDICINE
Payer: COMMERCIAL

## 2022-08-24 VITALS
OXYGEN SATURATION: 100 % | HEART RATE: 75 BPM | DIASTOLIC BLOOD PRESSURE: 90 MMHG | BODY MASS INDEX: 27.46 KG/M2 | RESPIRATION RATE: 12 BRPM | TEMPERATURE: 98.1 F | SYSTOLIC BLOOD PRESSURE: 136 MMHG | HEIGHT: 69 IN | WEIGHT: 185.4 LBS

## 2022-08-24 DIAGNOSIS — R04.0 EPISTAXIS: ICD-10-CM

## 2022-08-24 DIAGNOSIS — D64.9 ANEMIA, UNSPECIFIED TYPE: ICD-10-CM

## 2022-08-24 DIAGNOSIS — R23.3 ABNORMAL BRUISING: Primary | ICD-10-CM

## 2022-08-24 DIAGNOSIS — N92.0 MENORRHAGIA WITH REGULAR CYCLE: ICD-10-CM

## 2022-08-24 LAB
BASOPHILS # BLD AUTO: 0.1 10E3/UL (ref 0–0.2)
BASOPHILS NFR BLD AUTO: 1 %
EOSINOPHIL # BLD AUTO: 0.3 10E3/UL (ref 0–0.7)
EOSINOPHIL NFR BLD AUTO: 4 %
ERYTHROCYTE [DISTWIDTH] IN BLOOD BY AUTOMATED COUNT: 16.7 % (ref 10–15)
HCT VFR BLD AUTO: 35.6 % (ref 35–47)
HGB BLD-MCNC: 10.9 G/DL (ref 11.7–15.7)
IMM GRANULOCYTES # BLD: 0 10E3/UL
IMM GRANULOCYTES NFR BLD: 0 %
LYMPHOCYTES # BLD AUTO: 1.7 10E3/UL (ref 0.8–5.3)
LYMPHOCYTES NFR BLD AUTO: 28 %
MCH RBC QN AUTO: 23 PG (ref 26.5–33)
MCHC RBC AUTO-ENTMCNC: 30.6 G/DL (ref 31.5–36.5)
MCV RBC AUTO: 75 FL (ref 78–100)
MONOCYTES # BLD AUTO: 0.3 10E3/UL (ref 0–1.3)
MONOCYTES NFR BLD AUTO: 4 %
NEUTROPHILS # BLD AUTO: 3.9 10E3/UL (ref 1.6–8.3)
NEUTROPHILS NFR BLD AUTO: 63 %
NRBC # BLD AUTO: 0 10E3/UL
NRBC BLD AUTO-RTO: 0 /100
PLATELET # BLD AUTO: 367 10E3/UL (ref 150–450)
RBC # BLD AUTO: 4.74 10E6/UL (ref 3.8–5.2)
RETICS # AUTO: 0.06 10E6/UL (ref 0.03–0.1)
RETICS/RBC NFR AUTO: 1.3 % (ref 0.5–2)
WBC # BLD AUTO: 6.2 10E3/UL (ref 4–11)

## 2022-08-24 PROCEDURE — 36415 COLL VENOUS BLD VENIPUNCTURE: CPT | Performed by: PHYSICIAN ASSISTANT

## 2022-08-24 PROCEDURE — 85025 COMPLETE CBC W/AUTO DIFF WBC: CPT | Performed by: PHYSICIAN ASSISTANT

## 2022-08-24 PROCEDURE — 85240 CLOT FACTOR VIII AHG 1 STAGE: CPT | Performed by: PHYSICIAN ASSISTANT

## 2022-08-24 PROCEDURE — 85245 CLOT FACTOR VIII VW RISTOCTN: CPT | Performed by: PHYSICIAN ASSISTANT

## 2022-08-24 PROCEDURE — 85247 CLOT FACTOR VIII MULTIMETRIC: CPT | Performed by: PHYSICIAN ASSISTANT

## 2022-08-24 PROCEDURE — 99215 OFFICE O/P EST HI 40 MIN: CPT | Performed by: PHYSICIAN ASSISTANT

## 2022-08-24 PROCEDURE — 85060 BLOOD SMEAR INTERPRETATION: CPT | Performed by: STUDENT IN AN ORGANIZED HEALTH CARE EDUCATION/TRAINING PROGRAM

## 2022-08-24 PROCEDURE — 85246 CLOT FACTOR VIII VW ANTIGEN: CPT | Performed by: PHYSICIAN ASSISTANT

## 2022-08-24 PROCEDURE — G0463 HOSPITAL OUTPT CLINIC VISIT: HCPCS

## 2022-08-24 PROCEDURE — 85045 AUTOMATED RETICULOCYTE COUNT: CPT | Performed by: PHYSICIAN ASSISTANT

## 2022-08-24 PROCEDURE — 85390 FIBRINOLYSINS SCREEN I&R: CPT | Mod: 26 | Performed by: PATHOLOGY

## 2022-08-24 ASSESSMENT — PAIN SCALES - GENERAL: PAINLEVEL: NO PAIN (0)

## 2022-08-24 NOTE — PATIENT INSTRUCTIONS
[facility]  [facility] [address], [address], [place] [address]  Main: [phone], Fax: [phone]    Alex,   It was a pleasure seeing you today.  Thank you for allowing us to be involved in your care.  Please let us know if there is anything else we can do for you, so that we can be sure you are leaving completely satisfied with your care experience. Below is the plan that we discussed.     Labs today to check for von willebrand disease, check platelet count and hemoglobin.   We will call you return for a visit with special coagulation team to draw blood to look more in depth at your platelet function. You cannot have any medications that alter platelet function for a week prior. SEE LIST BELOW.      Return to clinic after your lab testing to review results. OK to do a virtual visit.      Your nurse clinician is Scarlett Betancourt -[phone].   If they are unavailable and you have immediate concerns, please call [phone] and ask for a nurse.     Joyce Calzada, MPH, PA-C  [facility]        Common Medications that Impair Platelet Function - DO NOT TAKE ANY FOR A WEEK PRIOR TO YOUR UPCOMING LABS.     Aspirin and aspirin containing products (i.e. Ecotrin, Bufferin, Anacin, Excedrin)     Most Nonsteroidal Anti-inflammatory Agents (i.e. Ibuprofen (Motrin, Advil), Naproxen sodium (Aleve, Naprosyn ), Ketoprofen (Orudis KT), Indomethacin (Indocin ),Ketorolac (Acular , Toradol )    **for pain management, Tylenol or Celebrex (an NSAID that does not alter platelet function) are preferred.     Selective serotonin reuptake inhibitors (i.e. Fluxetine (Prozac ), Sertaline (Zoloft ), Paroxetine (Paxil ).   **Antidepressants from another class (such as SNRIs) would be preferred. Talk with your provider about your options.     Various Vitamins/Herbal Medicines: Ginko (Ginkgo Biloba), Licorice  root, Tumeric, high doses of Vitamin E,      The following medications DO NOT impair platelet function    Acetaminophen (Tylenol , Liquiprin , Genapap , Panadol , Tempra , etc.) may be used for headache, pain or fever control and will not increase bleeding.    Celebrex  is a SALMERON-2 non-steroidal, used for pain and inflammation that does not affect platelet function. Salsalate is a non-steroidal used for pain and inflammation that does not affect platelet function.

## 2022-08-25 LAB
FACT VIII ACT/NOR PPP: 192 % (ref 55–200)
VWF AG ACT/NOR PPP IA: 159 % (ref 50–200)
VWF:AC ACT/NOR PPP IA: 111 % (ref 50–180)

## 2022-08-26 LAB
PATH REPORT.COMMENTS IMP SPEC: NORMAL
PATH REPORT.FINAL DX SPEC: NORMAL
PATH REPORT.MICROSCOPIC SPEC OTHER STN: NORMAL
PATH REPORT.MICROSCOPIC SPEC OTHER STN: NORMAL
PATH REPORT.RELEVANT HX SPEC: NORMAL
VWF MULTIMERS PPP IB: NORMAL

## 2022-08-30 LAB — VWF:RCO ACT/NOR PPP PL AGG: 124 %

## 2022-09-02 LAB — VWF MULTIMERS PPP QL: NORMAL

## 2022-09-06 LAB — VON WILLEBRAND EVAL PPP-IMP: NORMAL

## 2022-09-13 ENCOUNTER — ALLIED HEALTH/NURSE VISIT (OUTPATIENT)
Dept: HEMATOLOGY | Facility: CLINIC | Age: 42
End: 2022-09-13
Payer: COMMERCIAL

## 2022-09-13 DIAGNOSIS — R23.3 ABNORMAL BRUISING: ICD-10-CM

## 2022-09-13 LAB
Lab: NORMAL
PA AA BLD-ACNC: 65 %
PA ADP BLD-ACNC: 0.47 NM
PA ADP BLD-ACNC: 78 %
PA COLL PRP QL: 80 %
PA EPINEPH PRP QL: 83 %
PA RIST PRP QL: 4 %
PA RIST PRP QL: 78 %
PA RIST PRP QL: 86 %
PA RIST PRP QL: NORMAL
PA THROMBIN PRP: 0.7 NM
PERFORMING LABORATORY: NORMAL
SPECIMEN STATUS: NORMAL
TEST NAME: NORMAL

## 2022-09-13 PROCEDURE — 85576 BLOOD PLATELET AGGREGATION: CPT | Mod: 26 | Performed by: PATHOLOGY

## 2022-09-13 PROCEDURE — 36415 COLL VENOUS BLD VENIPUNCTURE: CPT

## 2022-09-13 PROCEDURE — 999N000103 HC STATISTIC NO CHARGE FACILITY FEE

## 2022-09-13 PROCEDURE — 84999 UNLISTED CHEMISTRY PROCEDURE: CPT

## 2022-09-13 PROCEDURE — 85576 BLOOD PLATELET AGGREGATION: CPT | Performed by: PATHOLOGY

## 2022-09-13 PROCEDURE — 85390 FIBRINOLYSINS SCREEN I&R: CPT

## 2022-09-13 PROCEDURE — 88348 ELECTRON MICROSCOPY DX: CPT

## 2022-09-21 LAB — MAYO MISC RESULT: NORMAL

## 2022-10-01 ENCOUNTER — HEALTH MAINTENANCE LETTER (OUTPATIENT)
Age: 42
End: 2022-10-01

## 2022-10-05 ENCOUNTER — VIRTUAL VISIT (OUTPATIENT)
Dept: HEMATOLOGY | Facility: CLINIC | Age: 42
End: 2022-10-05
Attending: PHYSICIAN ASSISTANT
Payer: COMMERCIAL

## 2022-10-05 DIAGNOSIS — R04.0 EPISTAXIS: ICD-10-CM

## 2022-10-05 DIAGNOSIS — R23.3 ABNORMAL BRUISING: Primary | ICD-10-CM

## 2022-10-05 DIAGNOSIS — N92.0 MENORRHAGIA WITH REGULAR CYCLE: ICD-10-CM

## 2022-10-05 PROCEDURE — 99214 OFFICE O/P EST MOD 30 MIN: CPT | Mod: 95 | Performed by: PHYSICIAN ASSISTANT

## 2022-10-05 RX ORDER — TRANEXAMIC ACID 650 MG/1
1300 TABLET ORAL 3 TIMES DAILY PRN
Qty: 60 TABLET | Refills: 1 | Status: SHIPPED | OUTPATIENT
Start: 2022-10-05
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
## 2023-03-08 ENCOUNTER — TRANSFERRED RECORDS (OUTPATIENT)
Dept: HEALTH INFORMATION MANAGEMENT | Facility: CLINIC | Age: 43
End: 2023-03-08
Payer: COMMERCIAL

## 2023-03-29 DIAGNOSIS — R05.3 CHRONIC COUGH: ICD-10-CM

## 2023-03-30 RX ORDER — ALBUTEROL SULFATE 90 UG/1
2 AEROSOL, METERED RESPIRATORY (INHALATION) EVERY 6 HOURS
Qty: 18 G | Refills: 0 | Status: SHIPPED | OUTPATIENT
Start: 2023-03-30

## 2023-03-30 NOTE — TELEPHONE ENCOUNTER
"Last Written Prescription Date:  7/20/22  Last Fill Quantity: 18g,  # refills: 0   Last office visit provider:  7/20/22     Requested Prescriptions   Pending Prescriptions Disp Refills     albuterol (PROAIR HFA/PROVENTIL HFA/VENTOLIN HFA) 108 (90 Base) MCG/ACT inhaler 18 g 0     Sig: Inhale 2 puffs into the lungs every 6 hours       Asthma Maintenance Inhalers - Anticholinergics Passed - 3/29/2023  2:53 PM        Passed - Patient is age 12 years or older        Passed - Recent (12 mo) or future (30 days) visit within the authorizing provider's specialty     Patient has had an office visit with the authorizing provider or a provider within the authorizing providers department within the previous 12 mos or has a future within next 30 days. See \"Patient Info\" tab in inbasket, or \"Choose Columns\" in Meds & Orders section of the refill encounter.              Passed - Medication is active on med list       Short-Acting Beta Agonist Inhalers Protocol  Passed - 3/29/2023  2:53 PM        Passed - Patient is age 12 or older        Passed - Recent (12 mo) or future (30 days) visit within the authorizing provider's specialty     Patient has had an office visit with the authorizing provider or a provider within the authorizing providers department within the previous 12 mos or has a future within next 30 days. See \"Patient Info\" tab in inbasket, or \"Choose Columns\" in Meds & Orders section of the refill encounter.              Passed - Medication is active on med list             SHAYLA COTRTELL RN 03/30/23 2:08 PM  "

## 2023-05-14 ENCOUNTER — HEALTH MAINTENANCE LETTER (OUTPATIENT)
Age: 43
End: 2023-05-14

## 2023-10-15 ENCOUNTER — HEALTH MAINTENANCE LETTER (OUTPATIENT)
Age: 43
End: 2023-10-15

## 2024-11-04 ASSESSMENT — PATIENT HEALTH QUESTIONNAIRE - PHQ9: SUM OF ALL RESPONSES TO PHQ QUESTIONS 1-9: 17

## 2024-12-08 ENCOUNTER — HEALTH MAINTENANCE LETTER (OUTPATIENT)
Age: 44
End: 2024-12-08

## 2025-05-18 ENCOUNTER — HEALTH MAINTENANCE LETTER (OUTPATIENT)
Age: 45
End: 2025-05-18

## (undated) DEVICE — DRAPE BACK TABLE PADDED 60X90

## (undated) DEVICE — SOL NACL 0.9% IRRIG 1000ML BOTTLE 2F7124

## (undated) DEVICE — RX SURGIFLO HEMOSTATIC MATRIX 8ML 2991

## (undated) DEVICE — ESU ELEC BLADE 2.75" COATED/INSULATED E1455

## (undated) DEVICE — DRAPE SHEET THYROID 77X123 89255

## (undated) DEVICE — SOL ADH LIQUID BENZOIN SWAB 0.6ML C1544

## (undated) DEVICE — SUCTION MANIFOLD NEPTUNE 2 SYS 1 PORT 702-025-000

## (undated) DEVICE — CUSTOM CATH LAB BASIN SET PACK 640PACK LHE SUTCNBPFCA

## (undated) DEVICE — TAPE ADH POROUS 3IN CURITY STD 7046C

## (undated) DEVICE — DRSG STERI STRIP 1/2X4" R1547

## (undated) DEVICE — PLATE GROUNDING ADULT W/CORD 9165L

## (undated) DEVICE — ESU PENCIL SMOKE EVAC W/ROCKER SWITCH 0703-047-000

## (undated) DEVICE — Device

## (undated) DEVICE — GLOVE SURG PI ULTRA TOUCH M SZ 8-1/2 LF

## (undated) DEVICE — GLOVE UNDER INDICATOR PI SZ 8.5 LF 41685

## (undated) DEVICE — CATH IV ANGIO INTRO 14GA X 1 3/4" 381467

## (undated) DEVICE — TOOL DISSECT MIDAS MR8 14CM MATCH HEAD 3MM MR8-14MH30

## (undated) DEVICE — CUSTOM PACK LUMBAR FUSION SNE5BLFHEA

## (undated) DEVICE — GLOVE BIOGEL PI INDICATOR 9.0 LF  41690

## (undated) DEVICE — SOL WATER IRRIG 1000ML BOTTLE 2F7114

## (undated) DEVICE — PREP DURAPREP 06ML APL 8635

## (undated) DEVICE — PIN DISTRACTION 12MM TI BLUE DP-12-TB

## (undated) DEVICE — GOWN IMPERVIOUS BREATHABLE 2XL/XLONG

## (undated) DEVICE — DRSG PRIMAPORE 03 1/8X6" 66000318

## (undated) DEVICE — SUTURE VICRYL+ 1 27IN CT-2 VLT VCP335H

## (undated) DEVICE — SUTURE VICRYL+ 2-0 CT-2 27" UND VCP269H

## (undated) DEVICE — GLOVE BIOGEL PI ORTHOPRO SZ 8.5 47685

## (undated) DEVICE — DRAPE C-ARM 60X42" 1013

## (undated) DEVICE — SPONGE NEURO 1/2X1/2 WECK 200100